# Patient Record
Sex: MALE | Race: WHITE | NOT HISPANIC OR LATINO | Employment: FULL TIME | URBAN - METROPOLITAN AREA
[De-identification: names, ages, dates, MRNs, and addresses within clinical notes are randomized per-mention and may not be internally consistent; named-entity substitution may affect disease eponyms.]

---

## 2017-09-18 ENCOUNTER — OFFICE VISIT (OUTPATIENT)
Dept: FAMILY MEDICINE | Facility: CLINIC | Age: 37
End: 2017-09-18
Payer: COMMERCIAL

## 2017-09-18 VITALS
WEIGHT: 220.4 LBS | BODY MASS INDEX: 31.55 KG/M2 | DIASTOLIC BLOOD PRESSURE: 74 MMHG | SYSTOLIC BLOOD PRESSURE: 110 MMHG | HEART RATE: 76 BPM | HEIGHT: 70 IN

## 2017-09-18 DIAGNOSIS — Z30.8 ENCOUNTER FOR OTHER CONTRACEPTIVE MANAGEMENT: Primary | ICD-10-CM

## 2017-09-18 PROCEDURE — 99213 OFFICE O/P EST LOW 20 MIN: CPT | Performed by: FAMILY MEDICINE

## 2017-09-18 RX ORDER — LORAZEPAM 1 MG/1
TABLET ORAL
Qty: 1 TABLET | Refills: 0 | Status: SHIPPED | OUTPATIENT
Start: 2017-09-18 | End: 2018-02-19

## 2017-09-18 NOTE — PROGRESS NOTES
SUBJECTIVE:  This gentleman is seen today for a vasectomy consult.  General vasectomy instruction sheet and post vas instruction sheet were made available to him and he had an opportunity to review these.  An opportunity for questions was given.     I explained the procedure in detail.  He realizes there is the risk of bleeding and infection, as well as postoperative discomfort for up to three months, development of sperm granulomas or epididymal cysts, and the possibility of failure to produce desired sterility.  He knows that he has to bring in a post vas sample after eight to twelve weeks and twelve to fifteen ejaculations, and that he needs a post vas semen analysis that shows no sperm before he can be considered sterile.  He knows that he will need to use another form of birth control until then.  He understands that he should be taking it easy over the several days afterwards with no heavy lifting, strenuous activity or sexual intercourse for one week after the procedure.    We will plan on doing this at his convenience with lorazepam 1 mg orally for sedation and local anesthetic.  He realizes that this is meant to be a permanent procedure.     History reviewed. No pertinent past medical history.      Past Surgical History:   Procedure Laterality Date     EXCISE GANGLION WRIST  3/9/2012    Procedure:EXCISE GANGLION WRIST; Excise Left Dorsal Ganglion with post interosseous neurectomy- choice Anesthesia; Surgeon:BEULAH GARCIA; Location:[place] OR     FRACTURE TX, WRIST RT/LT  2001    Fracture TX Wrist RT     wisdom teeth         OBJECTIVE:  General appearance: Healthy-appearing male  Genitalia: Not examined today as he had his young daughter in the office with him    ASSESSMENT:  Undesired fertility.     PLAN:  He will schedule for a vasectomy at his convenience.  General vasectomy information and post vas instruction sheet were sent home with him, and if he has questions in the meantime, he will call  me.

## 2017-09-18 NOTE — MR AVS SNAPSHOT
"              After Visit Summary   9/18/2017    Gabe Murillo    MRN: [id number]           Patient Information     Date Of Birth          1980        Visit Information        Provider Department      9/18/2017 3:40 PM SOTERO James MD [facility]        Today's Diagnoses     Encounter for other contraceptive management    -  1       Follow-ups after your visit        Who to contact     If you have questions or need follow up information about today's clinic visit or your schedule please contact [facility] directly at [phone].  Normal or non-critical lab and imaging results will be communicated to you by Sellobuyhart, letter or phone within 4 business days after the clinic has received the results. If you do not hear from us within 7 days, please contact the clinic through Athletic Standardt or phone. If you have a critical or abnormal lab result, we will notify you by phone as soon as possible.  Submit refill requests through Munchery or call your pharmacy and they will forward the refill request to us. Please allow 3 business days for your refill to be completed.          Additional Information About Your Visit        MyChart Information     Munchery gives you secure access to your electronic health record. If you see a primary care provider, you can also send messages to your care team and make appointments. If you have questions, please call your primary care clinic.  If you do not have a primary care provider, please call [phone] and they will assist you.        Care EveryWhere ID     This is your Care EveryWhere ID. This could be used by other organizations to access your [place] medical records  [id number]        Your Vitals Were     Pulse Height BMI (Body Mass Index)             76 5' 10\" (1.778 m) 31.62 kg/m2          Blood Pressure from Last 3 Encounters:   09/18/17 110/74   01/26/16 128/78   09/21/15 127/72    Weight from Last 3 Encounters:   09/18/17 220 lb 6.4 oz " (100 kg)   01/26/16 205 lb (93 kg)   09/21/15 191 lb (86.6 kg)              Today, you had the following     No orders found for display         Today's Medication Changes          These changes are accurate as of: 9/18/17  4:03 PM.  If you have any questions, ask your nurse or doctor.               Start taking these medicines.        Dose/Directions    LORazepam 1 MG tablet   Commonly known as:  ATIVAN   Used for:  Encounter for other contraceptive management   Started by:  SOTERO James MD        Take 60 minutes prior to appointment.  Do not operate a vehicle after taking this medication   Quantity:  1 tablet   Refills:  0            Where to get your medicines      Some of these will need a paper prescription and others can be bought over the counter.  Ask your nurse if you have questions.     Bring a paper prescription for each of these medications     LORazepam 1 MG tablet                Primary Care Provider Office Phone # Fax #    Taylor Damon [phone] [address] [facility] [address]        Equal Access to Services     [facility]: Hadii aad ku hadasho Soomaali, waaxda luqadaha, qaybta kaalmada adeegyada, waxay idiin hayaan juanito chan . So [facility] [phone].    ATENCIÓN: Si habla español, tiene a grimm disposición servicios gratuitos de asistencia lingüística. Llame al [phone].    We comply with applicable federal civil rights laws and [place] laws. We do not discriminate on the basis of race, color, national origin, age, disability sex, sexual orientation or gender identity.            Thank you!     Thank you for choosing [facility]  for your care. Our goal is always to provide you with excellent care. Hearing back from our patients is one way we can continue to improve our services. Please take a few minutes to complete the written survey that you may receive in the mail after your visit with us. Thank you!             Your Updated  Medication List - Protect others around you: Learn how to safely use, store and throw away your medicines at www.disposemymeds.org.          This list is accurate as of: 9/18/17  4:03 PM.  Always use your most recent med list.                   Brand Name Dispense Instructions for use Diagnosis    albuterol 108 (90 BASE) MCG/ACT Inhaler    PROAIR HFA/PROVENTIL HFA/VENTOLIN HFA    1 Inhaler    Inhale 2 puffs into the lungs every 6 hours as needed for shortness of breath / dyspnea or wheezing    Bronchospasm       citalopram 40 MG tablet    celeXA    90 tablet    1/2 tab QDAY x 7 days then 1 tab QDAY    Adjustment disorder with anxious mood       LORazepam 1 MG tablet    ATIVAN    1 tablet    Take 60 minutes prior to appointment.  Do not operate a vehicle after taking this medication    Encounter for other contraceptive management       MULTI-VITAMIN PO      Take  by mouth.

## 2017-09-18 NOTE — NURSING NOTE
"Chief Complaint   Patient presents with     Consult     vasectomy consult       Initial /74 (BP Location: Right arm, Patient Position: Chair, Cuff Size: Adult Large)  Pulse 76  Ht 5' 10\" (1.778 m)  Wt 220 lb 6.4 oz (100 kg)  BMI 31.62 kg/m2 Estimated body mass index is 31.62 kg/(m^2) as calculated from the following:    Height as of this encounter: 5' 10\" (1.778 m).    Weight as of this encounter: 220 lb 6.4 oz (100 kg).  Medication Reconciliation: complete     Rae Rowan, CMA      "

## 2017-09-28 ENCOUNTER — OFFICE VISIT (OUTPATIENT)
Dept: FAMILY MEDICINE | Facility: CLINIC | Age: 37
End: 2017-09-28
Payer: COMMERCIAL

## 2017-09-28 VITALS
DIASTOLIC BLOOD PRESSURE: 85 MMHG | WEIGHT: 208.2 LBS | BODY MASS INDEX: 29.81 KG/M2 | HEIGHT: 70 IN | HEART RATE: 78 BPM | SYSTOLIC BLOOD PRESSURE: 139 MMHG

## 2017-09-28 DIAGNOSIS — Z30.2 ENCOUNTER FOR STERILIZATION: Primary | ICD-10-CM

## 2017-09-28 PROCEDURE — 88302 TISSUE EXAM BY PATHOLOGIST: CPT | Performed by: FAMILY MEDICINE

## 2017-09-28 PROCEDURE — 55250 REMOVAL OF SPERM DUCT(S): CPT | Performed by: FAMILY MEDICINE

## 2017-09-28 NOTE — PATIENT INSTRUCTIONS
POSTOP VASECTOMY INSTRUCTIONS    There is usually very little discomfort or risk associated with the vasectomy, but you can expect some soreness after the operation.  The less work you do over the 36 hours after the procedure, the less chance that you will have more than mild to moderate discomfort.  it is best to lie quietly for the entire day after the surgery.    There are potential risks to this procedure, as with any procedure, but again, these are very rare.  You may get some bleeding under the skin with black and blue marks occurring.  Using ice bags to the scrotum once you go home greatly decreases the chance of developing this bruising.  Even so, this is usually minimal if it does occur.  It is possible to get an infection from the surgery and it is important to keep the area clean and dry for one week after the procedure allowing yourself only to shower and not bathe.  If you notice that you are getting a lot of swelling or bleeding or soreness, it is important that you talk to your physician or the physician on call immediately.    It is important that you avoid any athletic exertion, hard labor, long car rides and sexual activity for at least a few days afterward.    You may take Tylenol, Advil or similar analgesic for pain.  If you have skin sutures, these should dissolve and fall out spontaneously in 2-3 weeks.  During that time, you may have a little bit of black and blue discoloration around the incision and some local swelling.    FINALLY, it is VERY IMPORTANT that you furnish 2 semen specimens for microscopic examination - the first at 8-12 weeks, and the second at 12 months after the surgery.  The following instructions would apply to collection of the specimen:    Collect your semen in the morning directly into the container supplied by us.  Bring it into the office within 3-4 hours after ejaculation.    The sperm sample must be kept warm.    Sperm can be collected by either interrupting  "intercourse or by masturbation.    You may \"milk\" the specimen into the container from a condom which does not contain a spermicide.    Continue to use precautions against pregnancy until you have been informed that the semen exam failed to  show any sperm.  "

## 2017-09-28 NOTE — MR AVS SNAPSHOT
After Visit Summary   9/28/2017    Gabe Murillo    MRN: [id number]           Patient Information     Date Of Birth          1980        Visit Information        Provider Department      9/28/2017 3:40 PM Post, SOTERO Taylor MD [facility]        Today's Diagnoses     Encounter for sterilization    -  1      Care Instructions      POSTOP VASECTOMY INSTRUCTIONS    There is usually very little discomfort or risk associated with the vasectomy, but you can expect some soreness after the operation.  The less work you do over the 36 hours after the procedure, the less chance that you will have more than mild to moderate discomfort.  it is best to lie quietly for the entire day after the surgery.    There are potential risks to this procedure, as with any procedure, but again, these are very rare.  You may get some bleeding under the skin with black and blue marks occurring.  Using ice bags to the scrotum once you go home greatly decreases the chance of developing this bruising.  Even so, this is usually minimal if it does occur.  It is possible to get an infection from the surgery and it is important to keep the area clean and dry for one week after the procedure allowing yourself only to shower and not bathe.  If you notice that you are getting a lot of swelling or bleeding or soreness, it is important that you talk to your physician or the physician on call immediately.    It is important that you avoid any athletic exertion, hard labor, long car rides and sexual activity for at least a few days afterward.    You may take Tylenol, Advil or similar analgesic for pain.  If you have skin sutures, these should dissolve and fall out spontaneously in 2-3 weeks.  During that time, you may have a little bit of black and blue discoloration around the incision and some local swelling.    FINALLY, it is VERY IMPORTANT that you furnish 2 semen specimens for microscopic examination - the first at 8-12  "weeks, and the second at 12 months after the surgery.  The following instructions would apply to collection of the specimen:    Collect your semen in the morning directly into the container supplied by us.  Bring it into the office within 3-4 hours after ejaculation.    The sperm sample must be kept warm.    Sperm can be collected by either interrupting intercourse or by masturbation.    You may \"milk\" the specimen into the container from a condom which does not contain a spermicide.    Continue to use precautions against pregnancy until you have been informed that the semen exam failed to  show any sperm.          Follow-ups after your visit        Future tests that were ordered for you today     Open Future Orders        Priority Expected Expires Ordered    Semen Post Vasectomy Qual ([place], [place], [place]) Routine  9/28/2018 9/28/2017            Who to contact     If you have questions or need follow up information about today's clinic visit or your schedule please contact [facility] directly at [phone].  Normal or non-critical lab and imaging results will be communicated to you by CM Sistemihart, letter or phone within 4 business days after the clinic has received the results. If you do not hear from us within 7 days, please contact the clinic through Upkeep Charliet or phone. If you have a critical or abnormal lab result, we will notify you by phone as soon as possible.  Submit refill requests through makemoji or call your pharmacy and they will forward the refill request to us. Please allow 3 business days for your refill to be completed.          Additional Information About Your Visit        [facility] Information     makemoji gives you secure access to your electronic health record. If you see a primary care provider, you can also send messages to your care team and make appointments. If you have questions, please call your primary care clinic.  If you do not have a primary care provider, please call [phone] " "and they will assist you.        Care EveryWhere ID     This is your Care EveryWhere ID. This could be used by other organizations to access your [place] medical records  [id number]        Your Vitals Were     Pulse Height BMI (Body Mass Index)             78 5' 10\" (1.778 m) 29.87 kg/m2          Blood Pressure from Last 3 Encounters:   09/28/17 139/85   09/18/17 110/74   01/26/16 128/78    Weight from Last 3 Encounters:   09/28/17 208 lb 3.2 oz (94.4 kg)   09/18/17 220 lb 6.4 oz (100 kg)   01/26/16 205 lb (93 kg)              We Performed the Following     REMOVAL OF SPERM DUCT(S) [07319]     SURGICAL PATHOLOGY EXAM        Primary Care Provider Office Phone # Fax #    Taylor Damon [phone] [address]  [facility] [address]        Equal Access to Services     MIREILLE GRIMM : Hadii aad ku hadasho Soomaali, waaxda luqadaha, qaybta kaalmada adeegyada, waxay lorrainein haypamn juanito lee lamillan . So [facility] [phone].    ATENCIÓN: Si habla español, tiene a grimm disposición servicios gratuitos de asistencia lingüística. Llame al [phone].    We comply with applicable federal civil rights laws and [place] laws. We do not discriminate on the basis of race, color, national origin, age, disability sex, sexual orientation or gender identity.            Thank you!     Thank you for choosing [facility]  for your care. Our goal is always to provide you with excellent care. Hearing back from our patients is one way we can continue to improve our services. Please take a few minutes to complete the written survey that you may receive in the mail after your visit with us. Thank you!             Your Updated Medication List - Protect others around you: Learn how to safely use, store and throw away your medicines at www.disposemymeds.org.          This list is accurate as of: 9/28/17  4:55 PM.  Always use your most recent med list.                   Brand Name Dispense " Instructions for use Diagnosis    albuterol 108 (90 BASE) MCG/ACT Inhaler    PROAIR HFA/PROVENTIL HFA/VENTOLIN HFA    1 Inhaler    Inhale 2 puffs into the lungs every 6 hours as needed for shortness of breath / dyspnea or wheezing    Bronchospasm       citalopram 40 MG tablet    celeXA    90 tablet    1/2 tab QDAY x 7 days then 1 tab QDAY    Adjustment disorder with anxious mood       LORazepam 1 MG tablet    ATIVAN    1 tablet    Take 60 minutes prior to appointment.  Do not operate a vehicle after taking this medication    Encounter for other contraceptive management       MULTI-VITAMIN PO      Take  by mouth.

## 2017-09-28 NOTE — PROGRESS NOTES
Gabe Murillo is a 37 year old male here for vasectomy.     He has been in previously for vasectomy consult in which we discussed the no-scalpel procedure, including risks and benefits, and other options of birth control.  All questions have been answered and the consent form is signed.         Procedure:  He was placed in the supine position on the procedure table.  He was prepped and draped in the usual sterile fashion.  The vas was identified and brought up to the surface.  The skin overlying the vas was anesthetized with lidocaine and further lidocaine injected into the vas sheath.  The vas clamp was used to grasp the vas and the dissecting instrument was used to puncture the skin and dissect out the vas free of tissue. A segment of the vas was removed, both ends were burned and the proximal  and distal end tied with 4-0 silk.  The procedure was repeated for the other vas.  There were no complications.  He tolerated the procedure well.             A:  Vasectomy         P:  He was given post-vasectomy instructions.  He should apply ice   and wear support for the next 2-3 days.  He should  abstain from sexual intercourse and any heavy lifting for the next week.  Call or return to clinic for any  problems.  The excised specimens were sent for pathology.  He may use Ibuprofen 800 mg po TID prn for  pain.  He is not  considered sterile until follow up vas specimens are free of sperm.  justino Murillo is a 37 year old male here for vasectomy.     He has been in previously for vasectomy consult in which we discussed the no-scalpel procedure, including risks and benefits, and other options of birth control.  All questions have been answered and the consent form is signed.         Procedure:  He was placed in the supine position on the procedure table.  He was prepped and draped in the usual sterile fashion.  The vas was identified and brought up to the surface.  The skin overlying the vas was anesthetized with lidocaine  and further lidocaine injected into the vas sheath.  The vas clamp was used to grasp the vas and the dissecting instrument was used to puncture the skin and dissect out the vas free of tissue. A segment of the vas was removed, both ends were burned and the proximal  and distal end tied with 4-0 silk.  The procedure was repeated for the other vas.  There were no complications.  He tolerated the procedure well.             A:  Vasectomy         P:  He was given post-vasectomy instructions.  He should apply ice   and wear support for the next 2-3 days.  He should  abstain from sexual intercourse and any heavy lifting for the next week.  Call or return to clinic for any  problems.  The excised specimens were sent for pathology.  He may use Ibuprofen 800 mg po TID prn for  pain.  He is not  considered sterile until follow up vas specimens are free of sperm.

## 2017-10-02 LAB — COPATH REPORT: NORMAL

## 2017-12-18 ENCOUNTER — OFFICE VISIT (OUTPATIENT)
Dept: FAMILY MEDICINE | Facility: CLINIC | Age: 37
End: 2017-12-18
Payer: COMMERCIAL

## 2017-12-18 VITALS
OXYGEN SATURATION: 95 % | HEIGHT: 70 IN | DIASTOLIC BLOOD PRESSURE: 74 MMHG | WEIGHT: 215.9 LBS | TEMPERATURE: 97.8 F | BODY MASS INDEX: 30.91 KG/M2 | SYSTOLIC BLOOD PRESSURE: 128 MMHG | HEART RATE: 70 BPM

## 2017-12-18 DIAGNOSIS — J20.9 ACUTE BRONCHITIS, UNSPECIFIED ORGANISM: Primary | ICD-10-CM

## 2017-12-18 PROCEDURE — 99213 OFFICE O/P EST LOW 20 MIN: CPT | Performed by: FAMILY MEDICINE

## 2017-12-18 RX ORDER — DOXYCYCLINE 100 MG/1
100 CAPSULE ORAL 2 TIMES DAILY
Qty: 20 CAPSULE | Refills: 0 | Status: SHIPPED | OUTPATIENT
Start: 2017-12-18 | End: 2018-02-19

## 2017-12-18 NOTE — NURSING NOTE
"Chief Complaint   Patient presents with     URI       Initial /74 (BP Location: Right arm, Patient Position: Sitting, Cuff Size: Adult Large)  Pulse 70  Temp 97.8  F (36.6  C) (Tympanic)  Ht 5' 10\" (1.778 m)  Wt 215 lb 14.4 oz (97.9 kg)  SpO2 95%  BMI 30.98 kg/m2 Estimated body mass index is 30.98 kg/(m^2) as calculated from the following:    Height as of this encounter: 5' 10\" (1.778 m).    Weight as of this encounter: 215 lb 14.4 oz (97.9 kg).  Medication Reconciliation: complete   Haley Ferrer CMA  "

## 2017-12-18 NOTE — MR AVS SNAPSHOT
"              After Visit Summary   12/18/2017    Gabe Murillo    MRN: [id number]           Patient Information     Date Of Birth          1980        Visit Information        Provider Department      12/18/2017 9:40 AM Haile Will MD [facility]        Today's Diagnoses     Acute bronchitis, unspecified organism    -  1       Follow-ups after your visit        Who to contact     Normal or non-critical lab and imaging results will be communicated to you by demandmarthart, letter or phone within 4 business days after the clinic has received the results. If you do not hear from us within 7 days, please contact the clinic through demandmarthart or phone. If you have a critical or abnormal lab result, we will notify you by phone as soon as possible.  Submit refill requests through Glokalise or call your pharmacy and they will forward the refill request to us. Please allow 3 business days for your refill to be completed.          If you need to speak with a  for additional information , please call: [phone]             Additional Information About Your Visit        Glokalise Information     Glokalise gives you secure access to your electronic health record. If you see a primary care provider, you can also send messages to your care team and make appointments. If you have questions, please call your primary care clinic.  If you do not have a primary care provider, please call [phone] and they will assist you.        Care EveryWhere ID     This is your Care EveryWhere ID. This could be used by other organizations to access your [place] medical records  [id number]        Your Vitals Were     Pulse Temperature Height Pulse Oximetry BMI (Body Mass Index)       70 97.8  F (36.6  C) (Tympanic) 5' 10\" (1.778 m) 95% 30.98 kg/m2        Blood Pressure from Last 3 Encounters:   12/18/17 128/74   09/28/17 139/85   09/18/17 110/74    Weight from Last 3 Encounters:   12/18/17 215 lb 14.4 oz (97.9 kg) "   09/28/17 208 lb 3.2 oz (94.4 kg)   09/18/17 220 lb 6.4 oz (100 kg)              Today, you had the following     No orders found for display         Today's Medication Changes          These changes are accurate as of: 12/18/17  1:18 PM.  If you have any questions, ask your nurse or doctor.               Start taking these medicines.        Dose/Directions    doxycycline 100 MG capsule   Commonly known as:  VIBRAMYCIN   Used for:  Acute bronchitis, unspecified organism   Started by:  Haile Will MD        Dose:  100 mg   Take 1 capsule (100 mg) by mouth 2 times daily   Quantity:  20 capsule   Refills:  0            Where to get your medicines      These medications were sent to [facility] [place], [place] - [address]  [address] [facility] [address]     Phone:  [phone]     doxycycline 100 MG capsule                Primary Care Provider Office Phone # Fax #    Taylor Damon [phone] [address] [facility] [address]        Equal Access to Services     [facility]: Hadii aad ku hadasho Soomaali, waaxda luqadaha, qaybta kaalmada adeegyada, waxay idiin hayaan juanito khlester chan . So [facility] [phone].    ATENCIÓN: Si habla español, tiene a grimm disposición servicios gratuitos de asistencia lingüística. Llame al [phone].    We comply with applicable federal civil rights laws and [place] laws. We do not discriminate on the basis of race, color, national origin, age, disability, sex, sexual orientation, or gender identity.            Thank you!     Thank you for choosing [facility]  for your care. Our goal is always to provide you with excellent care. Hearing back from our patients is one way we can continue to improve our services. Please take a few minutes to complete the written survey that you may receive in the mail after your visit with us. Thank you!             Your Updated Medication List - Protect others around you:  Learn how to safely use, store and throw away your medicines at www.disposemymeds.org.          This list is accurate as of: 12/18/17  1:18 PM.  Always use your most recent med list.                   Brand Name Dispense Instructions for use Diagnosis    albuterol 108 (90 BASE) MCG/ACT Inhaler    PROAIR HFA/PROVENTIL HFA/VENTOLIN HFA    1 Inhaler    Inhale 2 puffs into the lungs every 6 hours as needed for shortness of breath / dyspnea or wheezing    Bronchospasm       citalopram 40 MG tablet    celeXA    90 tablet    1/2 tab QDAY x 7 days then 1 tab QDAY    Adjustment disorder with anxious mood       doxycycline 100 MG capsule    VIBRAMYCIN    20 capsule    Take 1 capsule (100 mg) by mouth 2 times daily    Acute bronchitis, unspecified organism       LORazepam 1 MG tablet    ATIVAN    1 tablet    Take 60 minutes prior to appointment.  Do not operate a vehicle after taking this medication    Encounter for other contraceptive management       MULTI-VITAMIN PO      Take  by mouth.

## 2017-12-18 NOTE — PROGRESS NOTES
SUBJECTIVE:                                                    Gabe Murillo is a 37 year old male who presents to clinic today for the following health issues:    ENT Symptoms             Symptoms: cc Present Absent Comment   Fever/Chills   x    Fatigue  x     Muscle Aches   x    Eye Irritation  x  Very wattery   Sneezing  x     Nasal David/Drg  x     Sinus Pressure/Pain   x    Loss of smell   x    Dental pain   x    Sore Throat  x  He believes that is from coughing so much.   Swollen Glands   x    Ear Pain/Fullness   x    Cough x x     Wheeze  x     Chest Pain   x    Shortness of breath  x     Rash   x    Other   x      Symptom duration:  a week   Symptom severity:  moderate   Treatments tried:  dayquil and mucinex   Contacts:  not that he is aware of.            Problem list and histories reviewed & adjusted, as indicated.  Additional history:     Patient Active Problem List   Diagnosis     Genital warts     CARDIOVASCULAR SCREENING; LDL GOAL LESS THAN 130     Anxiety     Past Surgical History:   Procedure Laterality Date     EXCISE GANGLION WRIST  3/9/2012    Procedure:EXCISE GANGLION WRIST; Excise Left Dorsal Ganglion with post interosseous neurectomy- choice Anesthesia; Surgeon:BEULAH GARCIA; Location:[place] [facility]     FRACTURE TX, WRIST RT/LT  2001    Fracture TX Wrist RT     VASECTOMY Bilateral 09/28/2017    No scalpel technique     wisdom teeth         Social History   Substance Use Topics     Smoking status: Never Smoker     Smokeless tobacco: Never Used     Alcohol use Yes     Family History   Problem Relation Age of Onset     Anxiety Disorder Mother      Anxiety Disorder Father      Arthritis Maternal Grandmother      Dementia Maternal Grandmother              ROS:  Constitutional, HEENT, cardiovascular, pulmonary, gi and gu systems are negative, except as otherwise noted.      OBJECTIVE:                                                    /74 (BP Location: Right arm, Patient Position: Sitting, Cuff  "Size: Adult Large)  Pulse 70  Temp 97.8  F (36.6  C) (Tympanic)  Ht 5' 10\" (1.778 m)  Wt 215 lb 14.4 oz (97.9 kg)  SpO2 95%  BMI 30.98 kg/m2 Body mass index is 30.98 kg/(m^2).   GENERAL: healthy, alert, well nourished, well hydrated, no distress  HENT: ear canals- normal; TMs- normal; Nose- normal; Mouth- no ulcers, no lesions  NECK: no tenderness, no adenopathy, no asymmetry, no masses, no stiffness; thyroid- normal to palpation  RESP: lungs clear to auscultation - no rales, no rhonchi, no wheezes  CV: regular rates and rhythm, normal S1 S2, no S3 or S4 and no murmur, no click or rub -  ABDOMEN: soft, no tenderness, no  hepatosplenomegaly, no masses, normal bowel sounds       ASSESSMENT/PLAN:                                                      (J20.9) Acute bronchitis, unspecified organism  (primary encounter diagnosis)  Plan: doxycycline (VIBRAMYCIN) 100 MG capsule     reports that he has never smoked. He has never used smokeless tobacco.        Weight management plan: Discussed healthy diet and exercise guidelines and patient will follow up in 6 months in clinic to re-evaluate.    [facility]  "

## 2017-12-21 ENCOUNTER — TELEPHONE (OUTPATIENT)
Dept: FAMILY MEDICINE | Facility: CLINIC | Age: 37
End: 2017-12-21

## 2017-12-21 DIAGNOSIS — Z30.2 ENCOUNTER FOR STERILIZATION: ICD-10-CM

## 2017-12-21 LAB — SEMEN ANALYSIS P VAS PNL: NORMAL

## 2017-12-21 PROCEDURE — 89321 SEMEN ANAL SPERM DETECTION: CPT | Performed by: FAMILY MEDICINE

## 2017-12-21 NOTE — TELEPHONE ENCOUNTER
Patient is calling wanting his lab results and I gave them to him. Understood.  Christina Hensley  Clinic Station  Flex

## 2017-12-21 NOTE — PROGRESS NOTES
Gabe,  The semen check showed no sperm.  You may discontinue the current method of birth control.  Have a [facility] :)      Brandon James MD

## 2018-02-19 ENCOUNTER — OFFICE VISIT (OUTPATIENT)
Dept: FAMILY MEDICINE | Facility: CLINIC | Age: 38
End: 2018-02-19
Payer: COMMERCIAL

## 2018-02-19 ENCOUNTER — TELEPHONE (OUTPATIENT)
Dept: FAMILY MEDICINE | Facility: CLINIC | Age: 38
End: 2018-02-19

## 2018-02-19 VITALS
WEIGHT: 215 LBS | TEMPERATURE: 98.4 F | HEIGHT: 70 IN | BODY MASS INDEX: 30.78 KG/M2 | HEART RATE: 89 BPM | DIASTOLIC BLOOD PRESSURE: 76 MMHG | RESPIRATION RATE: 18 BRPM | SYSTOLIC BLOOD PRESSURE: 132 MMHG

## 2018-02-19 DIAGNOSIS — J02.0 STREP THROAT: Primary | ICD-10-CM

## 2018-02-19 DIAGNOSIS — J10.1 INFLUENZA A: ICD-10-CM

## 2018-02-19 LAB
DEPRECATED S PYO AG THROAT QL EIA: ABNORMAL
SPECIMEN SOURCE: ABNORMAL

## 2018-02-19 PROCEDURE — 99214 OFFICE O/P EST MOD 30 MIN: CPT | Performed by: FAMILY MEDICINE

## 2018-02-19 PROCEDURE — 87880 STREP A ASSAY W/OPTIC: CPT | Performed by: FAMILY MEDICINE

## 2018-02-19 RX ORDER — OSELTAMIVIR PHOSPHATE 75 MG/1
75 CAPSULE ORAL 2 TIMES DAILY
Qty: 10 CAPSULE | Refills: 0 | Status: SHIPPED | OUTPATIENT
Start: 2018-02-19 | End: 2018-08-06

## 2018-02-19 RX ORDER — CEPHALEXIN 500 MG/1
500 CAPSULE ORAL 2 TIMES DAILY
Qty: 20 CAPSULE | Refills: 0 | Status: SHIPPED | OUTPATIENT
Start: 2018-02-19 | End: 2018-08-06

## 2018-02-19 ASSESSMENT — PAIN SCALES - GENERAL: PAINLEVEL: NO PAIN (0)

## 2018-02-19 NOTE — PATIENT INSTRUCTIONS
Thank you for choosing [facility].  You may be receiving a survey in the mail from [facility] regarding your visit today.  Please take a few minutes to complete and return the survey to let us know how we are doing.      Our Clinic hours are:  Mondays    7:20 am - 7 pm  Tues -  Fri  7:20 am - 5 pm    Clinic Phone: [phone]    The clinic lab opens at 7:30 am Mon - Fri and appointments are required.    [facility]. [phone]  Monday-Thursday 8 am - 7pm  Tues/Wed/Fri 8 am - 5:30 pm

## 2018-02-19 NOTE — PROGRESS NOTES
"  SUBJECTIVE:   Gabe Murillo is a 38 year old male who presents to clinic today for the following health issues:      Acute Illness   Acute illness concerns: fever, body aches  Onset: last night    Fever: YES-     Chills/Sweats: YES    Headache (location?): YES    Sinus Pressure:no    Conjunctivitis:  YES: both    Ear Pain: YES: both    Rhinorrhea: no    Congestion: no    Sore Throat: YES     Cough: no    Wheeze: no     Decreased Appetite: YES    Nausea: no     Vomiting: no     Diarrhea:  YES    Dysuria/Freq.: no     Fatigue/Achiness: YES    Sick/Strep Exposure: YES- family  (daughter had influenza and wife has strep)     Therapies Tried and outcome: motrin          Problem list and histories reviewed & adjusted, as indicated.  Additional history: He states he has never had strep before        Reviewed and updated as needed this visit by clinical staff       Reviewed and updated as needed this visit by Provider               ROS:  Constitutional, HEENT, cardiovascular, pulmonary, gi and gu systems are negative, except as otherwise noted.        OBJECTIVE:                                                    /76  Pulse 89  Temp 98.4  F (36.9  C) (Tympanic)  Resp 18  Ht 5' 10\" (1.778 m)  Wt 215 lb (97.5 kg)  BMI 30.85 kg/m2    GENERAL: healthy, alert and no distress  EYES: Eyes grossly normal to inspection, extraocular movements - intact, and PERRL  HENT: ear canals and TM's normal, tonsillar hypertrophy and tonsillar erythema  NECK: no tenderness, no adenopathy, no asymmetry, no masses, no stiffness; thyroid- normal to palpation  RESP: lungs clear to auscultation - no rales, no rhonchi, no wheezes  CV: regular rates and rhythm, normal S1 S2, no S3 or S4 and no murmur, no click or rub -  MS: extremities- no gross deformities noted, no edema    Diagnostic test results:  Strep screen - Positive     ASSESSMENT/PLAN:                                                    ASSESSMENT:  1. Strep throat    2. Influenza " A        PLAN:  We will hold off on starting the Tamiflu.  Will take the Keflex for the strep and if he feels much better in 12-24 hours will skip the Tamiflu.  If he still has achiness and fever would then start the Tamiflu right away so that it could be effective      Orders Placed This Encounter     oseltamivir (TAMIFLU) 75 MG capsule     cephALEXin (KEFLEX) 500 MG capsule       Patient Instructions         Thank you for choosing [facility].  You may be receiving a survey in the mail from [facility] regarding your visit today.  Please take a few minutes to complete and return the survey to let us know how we are doing.      Our Clinic hours are:  Mondays    7:20 am - 7 pm  Tues -  Fri  7:20 am - 5 pm    Clinic Phone: [phone]    The clinic lab opens at 7:30 am Mon - Fri and appointments are required.    [facility]  Ph. [phone]  Monday-Thursday 8 am - 7pm  Tues/Wed/Fri 8 am - 5:30 pm             SOTERO James  [facility]

## 2018-02-19 NOTE — TELEPHONE ENCOUNTER
Wife called and would like patient to be on tamiflu.  Patient will call back to discuss symptoms.    Aurora ASTORGA RN

## 2018-02-19 NOTE — MR AVS SNAPSHOT
After Visit Summary   2/19/2018    Gabe Murillo    MRN: [id number]           Patient Information     Date Of Birth          1980        Visit Information        Provider Department      2/19/2018 5:40 PM Jacob, SOTERO aTylor MD [facility]        Today's Diagnoses     Influenza A    -  1    Throat pain        Strep throat          Care Instructions          Thank you for choosing [facility].  You may be receiving a survey in the mail from [facility] regarding your visit today.  Please take a few minutes to complete and return the survey to let us know how we are doing.      Our Clinic hours are:  Mondays    7:20 am - 7 pm  Tues -  Fri  7:20 am - 5 pm    Clinic Phone: [phone]    The clinic lab opens at 7:30 am Mon - Fri and appointments are required.    [facility]  Ph. [phone]  Monday-Thursday 8 am - 7pm  Tues/Wed/Fri 8 am - 5:30 pm                 Follow-ups after your visit        Who to contact     If you have questions or need follow up information about today's clinic visit or your schedule please contact [facility] directly at [phone].  Normal or non-critical lab and imaging results will be communicated to you by MyChart, letter or phone within 4 business days after the clinic has received the results. If you do not hear from us within 7 days, please contact the clinic through MediaXstreamhart or phone. If you have a critical or abnormal lab result, we will notify you by phone as soon as possible.  Submit refill requests through ipsy or call your pharmacy and they will forward the refill request to us. Please allow 3 business days for your refill to be completed.          Additional Information About Your Visit        MyChart Information     ipsy gives you secure access to your electronic health record. If you see a primary care provider, you can also send messages to your care team and make appointments. If you have  "questions, please call your primary care clinic.  If you do not have a primary care provider, please call [phone] and they will assist you.        Care EveryWhere ID     This is your Care EveryWhere ID. This could be used by other organizations to access your [place] medical records  [id number]        Your Vitals Were     Pulse Temperature Respirations Height BMI (Body Mass Index)       89 98.4  F (36.9  C) (Tympanic) 18 5' 10\" (1.778 m) 30.85 kg/m2        Blood Pressure from Last 3 Encounters:   02/19/18 132/76   12/18/17 128/74   09/28/17 139/85    Weight from Last 3 Encounters:   02/19/18 215 lb (97.5 kg)   12/18/17 215 lb 14.4 oz (97.9 kg)   09/28/17 208 lb 3.2 oz (94.4 kg)              We Performed the Following     Strep, Rapid Screen          Today's Medication Changes          These changes are accurate as of 2/19/18  5:56 PM.  If you have any questions, ask your nurse or doctor.               Start taking these medicines.        Dose/Directions    cephALEXin 500 MG capsule   Commonly known as:  KEFLEX   Used for:  Strep throat   Started by:  SOTERO James MD        Dose:  500 mg   Take 1 capsule (500 mg) by mouth 2 times daily   Quantity:  20 capsule   Refills:  0       oseltamivir 75 MG capsule   Commonly known as:  TAMIFLU   Used for:  Influenza A   Started by:  SOTERO James MD        Dose:  75 mg   Take 1 capsule (75 mg) by mouth 2 times daily   Quantity:  10 capsule   Refills:  0            Where to get your medicines      These medications were sent to [facility] [place], [place] - [address]  [address] [facility] [address]     Phone:  [phone]     cephALEXin 500 MG capsule    oseltamivir 75 MG capsule                Primary Care Provider Office Phone # Fax #    Taylor Damon [phone] [address] [facility] [address]        Equal Access to Services     MIREILLE GRIMM AH: Hadii alisson Pappas, " oleksandr mendez, aida kasavannah jernigan, garrison lorrainein hayaan bettybirgit cainlester laModestoaacasie ah. So [facility] [phone].    ATENCIÓN: Si emilyla sasha, tiene a grimm disposición servicios gratuitos de asistencia lingüística. Claireame al [phone].    We comply with applicable federal civil rights laws and [place] laws. We do not discriminate on the basis of race, color, national origin, age, disability, sex, sexual orientation, or gender identity.            Thank you!     Thank you for choosing [facility]  for your care. Our goal is always to provide you with excellent care. Hearing back from our patients is one way we can continue to improve our services. Please take a few minutes to complete the written survey that you may receive in the mail after your visit with us. Thank you!             Your Updated Medication List - Protect others around you: Learn how to safely use, store and throw away your medicines at www.disposemymeds.org.          This list is accurate as of 2/19/18  5:56 PM.  Always use your most recent med list.                   Brand Name Dispense Instructions for use Diagnosis    cephALEXin 500 MG capsule    KEFLEX    20 capsule    Take 1 capsule (500 mg) by mouth 2 times daily    Strep throat       oseltamivir 75 MG capsule    TAMIFLU    10 capsule    Take 1 capsule (75 mg) by mouth 2 times daily    Influenza A

## 2018-08-06 ENCOUNTER — HOSPITAL ENCOUNTER (EMERGENCY)
Facility: CLINIC | Age: 38
Discharge: HOME | End: 2018-08-06
Attending: PHYSICIAN ASSISTANT | Admitting: PHYSICIAN ASSISTANT
Payer: COMMERCIAL

## 2018-08-06 VITALS
TEMPERATURE: 98.2 F | DIASTOLIC BLOOD PRESSURE: 89 MMHG | SYSTOLIC BLOOD PRESSURE: 152 MMHG | OXYGEN SATURATION: 98 % | BODY MASS INDEX: 30.8 KG/M2 | RESPIRATION RATE: 18 BRPM | HEIGHT: 71 IN | WEIGHT: 220 LBS

## 2018-08-06 DIAGNOSIS — B02.9 HERPES ZOSTER WITHOUT COMPLICATION: ICD-10-CM

## 2018-08-06 DIAGNOSIS — M79.10 MYALGIA: ICD-10-CM

## 2018-08-06 PROCEDURE — 99213 OFFICE O/P EST LOW 20 MIN: CPT | Mod: Z6 | Performed by: PHYSICIAN ASSISTANT

## 2018-08-06 PROCEDURE — G0463 HOSPITAL OUTPT CLINIC VISIT: HCPCS | Performed by: PHYSICIAN ASSISTANT

## 2018-08-06 RX ORDER — CYCLOBENZAPRINE HCL 10 MG
10 TABLET ORAL 2 TIMES DAILY PRN
Qty: 15 TABLET | Refills: 0 | Status: SHIPPED | OUTPATIENT
Start: 2018-08-06 | End: 2020-01-07

## 2018-08-06 RX ORDER — ACYCLOVIR 800 MG/1
800 TABLET ORAL
Qty: 35 TABLET | Refills: 0 | Status: SHIPPED | OUTPATIENT
Start: 2018-08-06 | End: 2020-01-07

## 2018-08-06 NOTE — ED AVS SNAPSHOT
[facility]    [address] [facility] [address]    Phone:  [phone]    Fax:  [phone]                                       Gabe Murillo   MRN: [id number]    Department:  [facility]   Date of Visit:  8/6/2018           After Visit Summary Signature Page     I have received my discharge instructions, and my questions have been answered. I have discussed any challenges I see with this plan with the nurse or doctor.    ..........................................................................................................................................  Patient/Patient Representative Signature      ..........................................................................................................................................  Patient Representative Print Name and Relationship to Patient    ..................................................               ................................................  Date                                            Time    ..........................................................................................................................................  Reviewed by Signature/Title    ...................................................              ..............................................  Date                                                            Time

## 2018-08-06 NOTE — DISCHARGE INSTRUCTIONS
Patient informed of viral etiology and treatment discussed in office.   Patient contagious until all vesicles scabbed over. Patient to avoid pregnant women, the immunocompromised, the very young and elderly.     Use medication as directed; caution with flagyl can cause sedation.     Follow up with PCP in 1-2 weeks if rash resolves and neck pain persists.    Go to Emergency Room if sx worsen or change, numbness in arms, neck stiffness, worst headache, visual changes, confusion, arm weakness occurs  May use acetaminophen, ibuprofen as needed for pain.     Patient voiced understanding of instructions given.

## 2018-08-06 NOTE — ED PROVIDER NOTES
History     Chief Complaint   Patient presents with     Rash     neck upper back/ possible shingles     HPI  Gabe Murillo is a 38 year old male who presents to the urgent care for right sided rash to the anterior and posterior shoulder/neck and starting to move down the right arm.  Patient states rash started 3 days ago.  Patient has been having some neck and muscle pain for the past 5-7 days with some tingling sensation or pain/burning sensation on the right shoulder and neck.  Patient states he has been seeing a chiropractor for this and had 2-3 adjustments over the past week with the last adjustment being 4 days ago and including an ultrasonic wave treatment. Patient not sure if this caused the rash or not. Patient states he has been putting topical calamine lotion on area with no relief. Patient states the muscles in his neck and shoulder and causing him to have a hard time sleeping at night and if he pushes on the tight muscle in the neck it relieves the pain. Patient denies numbness/weakness in the lower extremities. Patient states positive muscle tightness, painful/burning rash, and occasional slight headache.       Problem List:    Patient Active Problem List    Diagnosis Date Noted     Anxiety 09/29/2015     Priority: Medium     CARDIOVASCULAR SCREENING; LDL GOAL LESS THAN 130 08/05/2014     Priority: Medium     Genital warts 01/26/2011     Priority: Medium        Past Medical History:    History reviewed. No pertinent past medical history.    Past Surgical History:    Past Surgical History:   Procedure Laterality Date     EXCISE GANGLION WRIST  3/9/2012    Procedure:EXCISE GANGLION WRIST; Excise Left Dorsal Ganglion with post interosseous neurectomy- choice Anesthesia; Surgeon:BEULAH GARCIA; Location:[facility] [facility]     FRACTURE TX, WRIST RT/LT  2001    Fracture TX Wrist RT     VASECTOMY Bilateral 09/28/2017    No scalpel technique     wisdom teeth         Family History:    Family History   Problem Relation  "Age of Onset     Anxiety Disorder Mother      Anxiety Disorder Father      Arthritis Maternal Grandmother      Dementia Maternal Grandmother        Social History:  Marital Status:   [2]  Social History   Substance Use Topics     Smoking status: Never Smoker     Smokeless tobacco: Never Used     Alcohol use Yes        Medications:      acyclovir (ZOVIRAX) 800 MG tablet   cyclobenzaprine (FLEXERIL) 10 MG tablet   escitalopram (LEXAPRO) 10 MG tablet   HYDROcodone-acetaminophen (NORCO) 5-325 MG per tablet         Review of Systems   Constitutional: Negative for fatigue and fever.   Eyes: Negative for visual disturbance.   Musculoskeletal: Negative for neck stiffness.        Right sided neck pain and muscle tightness.    Skin: Rash: right shoulder, back/chest, and down right arm.    Neurological: Positive for headaches (occasionally; nothing currently. ). Negative for weakness, light-headedness and numbness.   All other systems reviewed and are negative.      Physical Exam   BP: 152/89  Heart Rate: 73  Temp: 98.2  F (36.8  C)  Resp: 18  Height: 180.3 cm (5' 11\")  Weight: 99.8 kg (220 lb)  SpO2: 98 %      Physical Exam   Constitutional: He is oriented to person, place, and time. He appears well-developed and well-nourished. No distress.   HENT:   Right Ear: External ear normal.   Left Ear: External ear normal.   Nose: Nose normal.   Mouth/Throat: Oropharynx is clear and moist. No oropharyngeal exudate.   Eyes: Conjunctivae and EOM are normal. Pupils are equal, round, and reactive to light. Right eye exhibits no discharge. Left eye exhibits no discharge.   Neck: Trachea normal, normal range of motion, full passive range of motion without pain and phonation normal. Neck supple. Muscular tenderness (right sided. ) present. No spinous process tenderness present.   No meningeal signs.    Cardiovascular: Normal rate, regular rhythm, normal heart sounds and intact distal pulses.    No murmur heard.  Pulmonary/Chest: " Effort normal and breath sounds normal.   Musculoskeletal: Normal range of motion.        Cervical back: He exhibits tenderness (right side of neck with point tenderness and muscle tightness noted. ) and pain (right side of neck. ). He exhibits normal range of motion, no bony tenderness, no swelling, no edema, no deformity, no laceration, no spasm and normal pulse.   Lymphadenopathy:     He has no cervical adenopathy.   Neurological: He is alert and oriented to person, place, and time. He has normal strength and normal reflexes. No cranial nerve deficit or sensory deficit. Gait normal. GCS eye subscore is 4. GCS verbal subscore is 5. GCS motor subscore is 6.   Skin: Skin is warm and dry. Rash noted. Rash is vesicular. He is not diaphoretic.        Few scabbed vesicles noted to the back.    Psychiatric: He has a normal mood and affect. His behavior is normal. Judgment and thought content normal.       ED Course     ED Course     Procedures              Critical Care time:  none               No results found for this or any previous visit (from the past 24 hour(s)).    Medications - No data to display    Assessments & Plan (with Medical Decision Making)     I have reviewed the nursing notes.    I have reviewed the findings, diagnosis, plan and need for follow up with the patient.     Patient informed of viral etiology and treatment discussed in office.   Patient contagious until all vesicles scabbed over. Patient to avoid pregnant women, the immunocompromised, the very young and elderly.     Use medication as directed; caution with flagyl can cause sedation.     Follow up with PCP in 1-2 weeks if rash resolves and neck pain persists.    Go to Emergency Room if sx worsen or change, numbness in arms, neck stiffness, worst headache, visual changes, confusion, arm weakness occurs  May use acetaminophen, ibuprofen as needed for pain.     No secondary cellulitis noted.    Patient voiced understanding of instructions given.    Discharge Medication List as of 8/6/2018  3:02 PM      START taking these medications    Details   acyclovir (ZOVIRAX) 800 MG tablet Take 1 tablet (800 mg) by mouth 5 times daily, Disp-35 tablet, R-0, E-Prescribe      cyclobenzaprine (FLEXERIL) 10 MG tablet Take 1 tablet (10 mg) by mouth 2 times daily as needed for muscle spasms, Disp-15 tablet, R-0, E-Prescribe             Final diagnoses:   Herpes zoster without complication   Myalgia       8/6/2018   [facility] EMERGENCY DEPARTMENT     Haley Page, RIKY  08/07/18 0907

## 2018-08-06 NOTE — ED AVS SNAPSHOT
[facility] Emergency Department    [address] [address]    Phone:  [phone]    Fax:  [phone]                                       Gabe Murillo   MRN: [id number]    Department:  [facility] Emergency Department   Date of Visit:  8/6/2018           Patient Information     Date Of Birth          1980        Your diagnoses for this visit were:     Herpes zoster without complication     Myalgia        You were seen by Haley Page PA-C.      Follow-up Information     Follow up with Taylor Damon MD In 1 week.    Specialty:  Family Practice    Why:  As needed    Contact information:    [address]  [facility] [address]  [phone]          Follow up with [facility] Emergency Department.    Specialty:  EMERGENCY MEDICINE    Why:  As needed, If symptoms worsen    Contact information:    [address] [place] [id number] [phone]    Additional information:    The medical center is located at   [address]. (between [address]   [address] in [place], [place] miles north   of [place]).        Discharge Instructions       Patient informed of viral etiology and treatment discussed in office.   Patient contagious until all vesicles scabbed over. Patient to avoid pregnant women, the immunocompromised, the very young and elderly.     Use medication as directed; caution with flagyl can cause sedation.     Follow up with PCP in 1-2 weeks if rash resolves and neck pain persists.    Go to Emergency Room if sx worsen or change, numbness in arms, neck stiffness, worst headache, visual changes, confusion, arm weakness occurs  May use acetaminophen, ibuprofen as needed for pain.     Patient voiced understanding of instructions given.              24 Hour Appointment Hotline       To make an appointment at any [facility] clinic, call [phone] ([phone]). If you don't have a family doctor or clinic, we will help you find one. [facility]  clinics are conveniently located to serve the needs of you and your family.             Review of your medicines      START taking        Dose / Directions Last dose taken    acyclovir 800 MG tablet   Commonly known as:  ZOVIRAX   Dose:  800 mg   Quantity:  35 tablet        Take 1 tablet (800 mg) by mouth 5 times daily   Refills:  0        cyclobenzaprine 10 MG tablet   Commonly known as:  FLEXERIL   Dose:  10 mg   Quantity:  15 tablet        Take 1 tablet (10 mg) by mouth 2 times daily as needed for muscle spasms   Refills:  0                Prescriptions were sent or printed at these locations (2 Prescriptions)                   [facility] [place], [place] - [address]   [address] [address]    Telephone:  [phone]   Fax:  [phone]   Hours:                  E-Prescribed (2 of 2)         acyclovir (ZOVIRAX) 800 MG tablet               cyclobenzaprine (FLEXERIL) 10 MG tablet                Orders Needing Specimen Collection     None      Pending Results     No orders found from 8/4/2018 to 8/7/2018.            Pending Culture Results     No orders found from 8/4/2018 to 8/7/2018.            Pending Results Instructions     If you had any lab results that were not finalized at the time of your Discharge, you can call the ED Lab Result RN at [phone]. You will be contacted by this team for any positive Lab results or changes in treatment. The nurses are available 7 days a week from 10A to 6:30P.  You can leave a message 24 hours per day and they will return your call.        Test Results From Your Hospital Stay               Thank you for choosing [facility]       Thank you for choosing [facility] for your care. Our goal is always to provide you with excellent care. Hearing back from our patients is one way we can continue to improve our services. Please take a few minutes to complete the written survey that you may receive in the mail after you visit with us. Thank you!         [employer] (Inspect Manager) Information     [employer] ([employer]) gives you secure access to your electronic health record. If you see a primary care provider, you can also send messages to your care team and make appointments. If you have questions, please call your primary care clinic.  If you do not have a primary care provider, please call [phone] and they will assist you.        Care EveryWhere ID     This is your Care EveryWhere ID. This could be used by other organizations to access your [facility] medical records  [id number]        Equal Access to Services     MIREILLE GRIMM : Hadarnoldo schaeffero Soleon, waaxda luqadaha, qaybta kaalmada delon, [place]. So [facility] [phone].    ATENCIÓN: Si habla español, tiene a grimm disposición servicios gratuitos de asistencia lingüística. Llame al [phone].    We comply with applicable federal civil rights laws and [place] laws. We do not discriminate on the basis of race, color, national origin, age, disability, sex, sexual orientation, or gender identity.            After Visit Summary       This is your record. Keep this with you and show to your community pharmacist(s) and doctor(s) at your next visit.

## 2018-08-06 NOTE — ED TRIAGE NOTES
Patient here for rash on the L shoulder - started after ultrasound treatment for neck pain.  Patient presents ambulatory to the urgent care.

## 2018-08-07 ENCOUNTER — OFFICE VISIT (OUTPATIENT)
Dept: FAMILY MEDICINE | Facility: CLINIC | Age: 38
End: 2018-08-07
Payer: COMMERCIAL

## 2018-08-07 VITALS
HEIGHT: 70 IN | DIASTOLIC BLOOD PRESSURE: 84 MMHG | HEART RATE: 74 BPM | RESPIRATION RATE: 20 BRPM | OXYGEN SATURATION: 98 % | TEMPERATURE: 97.1 F | SYSTOLIC BLOOD PRESSURE: 136 MMHG | BODY MASS INDEX: 30.92 KG/M2 | WEIGHT: 216 LBS

## 2018-08-07 DIAGNOSIS — B02.9 HERPES ZOSTER WITHOUT COMPLICATION: Primary | ICD-10-CM

## 2018-08-07 DIAGNOSIS — F41.9 ANXIETY: ICD-10-CM

## 2018-08-07 PROCEDURE — 99214 OFFICE O/P EST MOD 30 MIN: CPT | Performed by: NURSE PRACTITIONER

## 2018-08-07 RX ORDER — HYDROCODONE BITARTRATE AND ACETAMINOPHEN 5; 325 MG/1; MG/1
1-2 TABLET ORAL
Qty: 20 TABLET | Refills: 0 | Status: SHIPPED | OUTPATIENT
Start: 2018-08-07 | End: 2020-01-07

## 2018-08-07 RX ORDER — ESCITALOPRAM OXALATE 10 MG/1
10 TABLET ORAL DAILY
Qty: 30 TABLET | Refills: 1 | Status: SHIPPED | OUTPATIENT
Start: 2018-08-07 | End: 2018-09-07

## 2018-08-07 ASSESSMENT — ANXIETY QUESTIONNAIRES
7. FEELING AFRAID AS IF SOMETHING AWFUL MIGHT HAPPEN: SEVERAL DAYS
GAD7 TOTAL SCORE: 14
5. BEING SO RESTLESS THAT IT IS HARD TO SIT STILL: SEVERAL DAYS
2. NOT BEING ABLE TO STOP OR CONTROL WORRYING: MORE THAN HALF THE DAYS
3. WORRYING TOO MUCH ABOUT DIFFERENT THINGS: NEARLY EVERY DAY
1. FEELING NERVOUS, ANXIOUS, OR ON EDGE: NEARLY EVERY DAY
6. BECOMING EASILY ANNOYED OR IRRITABLE: NEARLY EVERY DAY

## 2018-08-07 ASSESSMENT — PATIENT HEALTH QUESTIONNAIRE - PHQ9: 5. POOR APPETITE OR OVEREATING: SEVERAL DAYS

## 2018-08-07 ASSESSMENT — ENCOUNTER SYMPTOMS
FATIGUE: 0
NECK STIFFNESS: 0
LIGHT-HEADEDNESS: 0
FEVER: 0
HEADACHES: 1
NUMBNESS: 0
WEAKNESS: 0

## 2018-08-07 NOTE — PROGRESS NOTES
SUBJECTIVE:   Gabe Murillo is a 38 year old male who presents to clinic today for the following health issues:  Chief Complaint   Patient presents with     Shingles     urgent care follow up for shingles      UC Follow-Up     Anxiety         ED/UC Followup:  Facility:  [facility]   Date of visit: 08/06/2018  Reason for visit: nerve pain in neck-   Current Status: rates pain at 5/10  At night rates pain at 9/10  . Unable to sleep at night     Tried the flexeril last night and was not effective.        He has been having problems with pain in his right neck for a week and the chiropractor was not helping.  He then developed a rash.  Gabe was seen in UC yesterday and he was diagnosed with shingles.  He has been taking ibuprofen for the pain.  He was given a muscle relaxant but it didn't help.  He continues to have signifciant pain.    The rash on his right neck is large, blistery/scabbed.  The rash did seem a little bigger today.  He is taking his acyclovir as prescribed.    No fever or chills.  He is eating and drinking normally.  He worked today:      Mood:  Gabe has been having problems with anxiety and depression related to his job stress.  He has had problems with anxiety in the past and was prescribed citalopram.  He stopped the citalopram because it gave him  Side effects that he did not like, he felt flat.  Today, his mood is worsening.  He denies feeling suicidal.  His wife is worried about him because he is irritable and anxious.  He does not sleep well at night with his thoughts racing.  He would like to start meds today if possible.    Problem list and histories reviewed & adjusted, as indicated.  Additional history: as documented    Patient Active Problem List   Diagnosis     Genital warts     CARDIOVASCULAR SCREENING; LDL GOAL LESS THAN 130     Anxiety     Past Surgical History:   Procedure Laterality Date     EXCISE GANGLION WRIST  3/9/2012    Procedure:EXCISE GANGLION WRIST; Excise Left Dorsal  "Ganglion with post interosseous neurectomy- choice Anesthesia; Surgeon:BEULAH GARCIA; Location:[facility] OR     FRACTURE TX, WRIST RT/LT  2001    Fracture TX Wrist RT     VASECTOMY Bilateral 09/28/2017    No scalpel technique     wisdom teeth         Social History   Substance Use Topics     Smoking status: Never Smoker     Smokeless tobacco: Never Used     Alcohol use Yes     Family History   Problem Relation Age of Onset     Anxiety Disorder Mother      Anxiety Disorder Father      Arthritis Maternal Grandmother      Dementia Maternal Grandmother          Current Outpatient Prescriptions   Medication Sig Dispense Refill     acyclovir (ZOVIRAX) 800 MG tablet Take 1 tablet (800 mg) by mouth 5 times daily 35 tablet 0     cyclobenzaprine (FLEXERIL) 10 MG tablet Take 1 tablet (10 mg) by mouth 2 times daily as needed for muscle spasms 15 tablet 0     Allergies   Allergen Reactions     Pcn [Penicillin V]      Penicillin allergy - as a baby, has not had penicillin since then, unknown reaction. Tolerated ancef during surgery 2012     No lab results found.   BP Readings from Last 3 Encounters:   08/07/18 136/84   08/06/18 152/89   02/19/18 132/76    Wt Readings from Last 3 Encounters:   08/07/18 216 lb (98 kg)   08/06/18 220 lb (99.8 kg)   02/19/18 215 lb (97.5 kg)            Labs reviewed in EPIC    Reviewed and updated as needed this visit by clinical staff       Reviewed and updated as needed this visit by Provider         ROS:  Constitutional, HEENT, cardiovascular, pulmonary, GI, , musculoskeletal, neuro, skin, endocrine and psych systems are negative, except as otherwise noted.    OBJECTIVE:     /84  Pulse 74  Temp 97.1  F (36.2  C) (Oral)  Resp 20  Ht 5' 10\" (1.778 m)  Wt 216 lb (98 kg)  SpO2 98%  BMI 30.99 kg/m2  Body mass index is 30.99 kg/(m^2).    GENERAL APPEARANCE: healthy, alert and no distress. Smiling.   SKIN: warm and dry.  There is a large, erythematous blistery patches and some are scabbed on " his right lateral neck and upper shoulder.  This rash does radiate [place] down his posterior right shoulder blade as well as below his clavicle on his right anterior chest.  PSYCH: mentation appears normal and affect normal/bright.    He offers information freely.  Good eye contact.      ASSESSMENT/PLAN:     (B02.9) Herpes zoster without complication  (primary encounter diagnosis)  Comment: Acute  Plan: HYDROcodone-acetaminophen (NORCO) 5-325 MG per         tablet        I did talk with Gabe about taking the acyclovir as prescribed.  I encouraged him to take ibuprofen 600mg TID with food for the next 5-10 days.  He can take the hydrocodone at night time for sleep as prescribed. I did tell him that this Rx was for short term use only and additional refills would only be considered with a face to face appointment.  He will plan to follow up at [facility] (near his home).  He was appreciative.    (F41.9) Anxiety  Comment: worse  Plan: escitalopram (LEXAPRO) 10 MG tablet        I did talk with Gabe at length about medication management options, the citalopram, and today I did prescribe lexapro.  Since his symptoms have been stable and are not acutely worse, I did encourage him Read the side effect profile that comes with the medication.  I also asked him to wait a week to start the lexapro since he is taking the acyclovir, norco, ibup, flexeril etc.  He was appreciative and agrees.  He is to follow up with his PCP in 1-2 months for additional refills/a med check appointment, sooner if problems.    I spent a total of 30 min with the patient, >50% time spent face to face counseling regarding the above issues, establishing a plan of care, and coordinating follow up care.     RAQUEL Dean [facility]

## 2018-08-07 NOTE — MR AVS SNAPSHOT
"              After Visit Summary   8/7/2018    Gabe Murillo    MRN: [id number]           Patient Information     Date Of Birth          1980        Visit Information        Provider Department      8/7/2018 2:20 PM Rona Reynaga APRN CNP [facility]        Today's Diagnoses     Herpes zoster without complication    -  1    Anxiety           Follow-ups after your visit        Who to contact     If you have questions or need follow up information about today's clinic visit or your schedule please contact [facility] directly at [phone].  Normal or non-critical lab and imaging results will be communicated to you by Equallogichart, letter or phone within 4 business days after the clinic has received the results. If you do not hear from us within 7 days, please contact the clinic through Equallogichart or phone. If you have a critical or abnormal lab result, we will notify you by phone as soon as possible.  Submit refill requests through En[employer] Interactive or call your pharmacy and they will forward the refill request to us. Please allow 3 business days for your refill to be completed.          Additional Information About Your Visit        MyChart Information     [employer] gives you secure access to your electronic health record. If you see a primary care provider, you can also send messages to your care team and make appointments. If you have questions, please call your primary care clinic.  If you do not have a primary care provider, please call [phone] and they will assist you.        Care EveryWhere ID     This is your Care EveryWhere ID. This could be used by other organizations to access your [facility] medical records  [id number]        Your Vitals Were     Pulse Temperature Respirations Height Pulse Oximetry BMI (Body Mass Index)    74 97.1  F (36.2  C) (Oral) 20 5' 10\" (1.778 m) 98% 30.99 kg/m2       Blood Pressure from Last 3 Encounters:   08/07/18 136/84   08/06/18 " 152/89   02/19/18 132/76    Weight from Last 3 Encounters:   08/07/18 216 lb (98 kg)   08/06/18 220 lb (99.8 kg)   02/19/18 215 lb (97.5 kg)              Today, you had the following     No orders found for display         Today's Medication Changes          These changes are accurate as of 8/7/18 11:59 PM.  If you have any questions, ask your nurse or doctor.               Start taking these medicines.        Dose/Directions    escitalopram 10 MG tablet   Commonly known as:  LEXAPRO   Used for:  Anxiety   Started by:  Rona Reynaga APRN CNP        Dose:  10 mg   Take 1 tablet (10 mg) by mouth daily   Quantity:  30 tablet   Refills:  1       HYDROcodone-acetaminophen 5-325 MG per tablet   Commonly known as:  NORCO   Used for:  Herpes zoster without complication   Started by:  Rona Reynaga APRN CNP        Dose:  1-2 tablet   Take 1-2 tablets by mouth nightly as needed for severe pain or other (Moderate to Severe Pain)   Quantity:  20 tablet   Refills:  0            Where to get your medicines      These medications were sent to [facility], [place] - [address] [facility]  [address] [facility] [address]     Phone:  [phone]     escitalopram 10 MG tablet         Some of these will need a paper prescription and others can be bought over the counter.  Ask your nurse if you have questions.     Bring a paper prescription for each of these medications     HYDROcodone-acetaminophen 5-325 MG per tablet               Information about OPIOIDS     PRESCRIPTION OPIOIDS: WHAT YOU NEED TO KNOW   We gave you an opioid (narcotic) pain medicine. It is important to manage your pain, but opioids are not always the best choice. You should first try all the other options your care team gave you. Take this medicine for as short a time (and as few doses) as possible.    Some activities can increase your pain, such as bandage changes or therapy sessions. It may  help to take your pain medicine 30 to 60 minutes before these activities. Reduce your stress by getting enough sleep, working on hobbies you enjoy and practicing relaxation or meditation. Talk to your care team about ways to manage your pain beyond prescription opioids.    These medicines have risks:    DO NOT drive when on new or higher doses of pain medicine. These medicines can affect your alertness and reaction times, and you could be arrested for driving under the influence (DUI). If you need to use opioids long-term, talk to your care team about driving.    DO NOT operate heavy machinery    DO NOT do any other dangerous activities while taking these medicines.    DO NOT drink any alcohol while taking these medicines.     If the opioid prescribed includes acetaminophen, DO NOT take with any other medicines that contain acetaminophen. Read all labels carefully. Look for the word  acetaminophen  or  Tylenol.  Ask your pharmacist if you have questions or are unsure.    You can get addicted to pain medicines, especially if you have a history of addiction (chemical, alcohol or substance dependence). Talk to your care team about ways to reduce this risk.    All opioids tend to cause constipation. Drink plenty of water and eat foods that have a lot of fiber, such as fruits, vegetables, prune juice, apple juice and high-fiber cereal. Take a laxative (Miralax, milk of magnesia, Colace, Senna) if you don t move your bowels at least every other day. Other side effects include upset stomach, sleepiness, dizziness, throwing up, tolerance (needing more of the medicine to have the same effect), physical dependence and slowed breathing.    Store your pills in a secure place, locked if possible. We will not replace any lost or stolen medicine. If you don t finish your medicine, please throw away (dispose) as directed by your pharmacist. The [place] Pollution Control Agency has more information about safe disposal:  https://www.pca.[facility].[place].us/living-green/managing-unwanted-medications         Primary Care Provider Office Phone # Fax #    Taylor Damon [phone] [address]  [facility] [address]        Equal Access to Services     DONTAEPHIL SIRISHA : Hadii aad ku hadasho Soomaali, waaxda luqadaha, qaybta kaalmada adeegyada, waxmartha guzman emilianon bettybirgit lee lacarito ramsey. So [facility] [phone].    ATENCIÓN: Si habla español, tiene a grimm disposición servicios gratuitos de asistencia lingüística. Jamie al [phone].    We comply with applicable federal civil rights laws and [place] laws. We do not discriminate on the basis of race, color, national origin, age, disability, sex, sexual orientation, or gender identity.            Thank you!     Thank you for choosing [facility]  for your care. Our goal is always to provide you with excellent care. Hearing back from our patients is one way we can continue to improve our services. Please take a few minutes to complete the written survey that you may receive in the mail after your visit with us. Thank you!             Your Updated Medication List - Protect others around you: Learn how to safely use, store and throw away your medicines at www.disposemymeds.org.          This list is accurate as of 8/7/18 11:59 PM.  Always use your most recent med list.                   Brand Name Dispense Instructions for use Diagnosis    acyclovir 800 MG tablet    ZOVIRAX    35 tablet    Take 1 tablet (800 mg) by mouth 5 times daily        cyclobenzaprine 10 MG tablet    FLEXERIL    15 tablet    Take 1 tablet (10 mg) by mouth 2 times daily as needed for muscle spasms        escitalopram 10 MG tablet    LEXAPRO    30 tablet    Take 1 tablet (10 mg) by mouth daily    Anxiety       HYDROcodone-acetaminophen 5-325 MG per tablet    NORCO    20 tablet    Take 1-2 tablets by mouth nightly as needed for severe pain or other (Moderate to Severe Pain)    Herpes  zoster without complication

## 2018-08-08 ASSESSMENT — ANXIETY QUESTIONNAIRES: GAD7 TOTAL SCORE: 14

## 2018-08-08 ASSESSMENT — PATIENT HEALTH QUESTIONNAIRE - PHQ9: SUM OF ALL RESPONSES TO PHQ QUESTIONS 1-9: 15

## 2018-09-07 ENCOUNTER — OFFICE VISIT (OUTPATIENT)
Dept: FAMILY MEDICINE | Facility: CLINIC | Age: 38
End: 2018-09-07
Payer: COMMERCIAL

## 2018-09-07 VITALS
TEMPERATURE: 98.2 F | HEIGHT: 70 IN | RESPIRATION RATE: 20 BRPM | SYSTOLIC BLOOD PRESSURE: 133 MMHG | WEIGHT: 217 LBS | BODY MASS INDEX: 31.07 KG/M2 | DIASTOLIC BLOOD PRESSURE: 84 MMHG | HEART RATE: 74 BPM

## 2018-09-07 DIAGNOSIS — F41.9 ANXIETY: ICD-10-CM

## 2018-09-07 PROCEDURE — 99213 OFFICE O/P EST LOW 20 MIN: CPT | Performed by: FAMILY MEDICINE

## 2018-09-07 RX ORDER — ESCITALOPRAM OXALATE 10 MG/1
10 TABLET ORAL DAILY
Qty: 90 TABLET | Refills: 3 | Status: SHIPPED | OUTPATIENT
Start: 2018-09-07 | End: 2020-01-07

## 2018-09-07 NOTE — MR AVS SNAPSHOT
"              After Visit Summary   9/7/2018    Gabe Murillo    MRN: [id number]           Patient Information     Date Of Birth          1980        Visit Information        Provider Department      9/7/2018 2:40 PM Crow Burdick MD [facility]        Today's Diagnoses     Anxiety           Follow-ups after your visit        Who to contact     If you have questions or need follow up information about today's clinic visit or your schedule please contact [facility] directly at [phone].  Normal or non-critical lab and imaging results will be communicated to you by MyChart, letter or phone within 4 business days after the clinic has received the results. If you do not hear from us within 7 days, please contact the clinic through Waizyt or phone. If you have a critical or abnormal lab result, we will notify you by phone as soon as possible.  Submit refill requests through Vendly or call your pharmacy and they will forward the refill request to us. Please allow 3 business days for your refill to be completed.          Additional Information About Your Visit        MyChart Information     [employer] gives you secure access to your electronic health record. If you see a primary care provider, you can also send messages to your care team and make appointments. If you have questions, please call your primary care clinic.  If you do not have a primary care provider, please call [phone] and they will assist you.        Care EveryWhere ID     This is your Care EveryWhere ID. This could be used by other organizations to access your [facility] medical records  [id number]        Your Vitals Were     Pulse Temperature Respirations Height BMI (Body Mass Index)       74 98.2  F (36.8  C) (Tympanic) 20 5' 10\" (1.778 m) 31.14 kg/m2        Blood Pressure from Last 3 Encounters:   09/07/18 133/84   08/07/18 136/84   08/06/18 152/89    Weight from Last 3 Encounters:   09/07/18 217 lb " (98.4 kg)   08/07/18 216 lb (98 kg)   08/06/18 220 lb (99.8 kg)              Today, you had the following     No orders found for display         Where to get your medicines      [place] [facility] [place] - [address] [facility]  [address] [facility] [address]     Phone:  [phone]     escitalopram 10 MG tablet          Primary Care Provider Office Phone # Fax #    Taylor Damon [phone] [address]  [facility] [address]        Equal Access to Services     [facility] Medical CenterSOTERO : Hadii alisson Pappas, waaxda lubenny, qaybta kaalmada delon, garrison chan . So [facility] [phone].    ATENCIÓN: Si habla español, tiene a grimm disposición servicios gratuitos de asistencia lingüística. Llame al [phone].    We comply with applicable federal civil rights laws and [place] laws. We do not discriminate on the basis of race, color, national origin, age, disability, sex, sexual orientation, or gender identity.            Thank you!     Thank you for choosing [facility]  for your care. Our goal is always to provide you with excellent care. Hearing back from our patients is one way we can continue to improve our services. Please take a few minutes to complete the written survey that you may receive in the mail after your visit with us. Thank you!             Your Updated Medication List - Protect others around you: Learn how to safely use, store and throw away your medicines at www.disposemymeds.org.          This list is accurate as of 9/7/18  3:14 PM.  Always use your most recent med list.                   Brand Name Dispense Instructions for use Diagnosis    acyclovir 800 MG tablet    ZOVIRAX    35 tablet    Take 1 tablet (800 mg) by mouth 5 times daily        cyclobenzaprine 10 MG tablet    FLEXERIL    15 tablet    Take 1 tablet (10 mg) by mouth 2 times daily as  needed for muscle spasms        escitalopram 10 MG tablet    LEXAPRO    90 tablet    Take 1 tablet (10 mg) by mouth daily    Anxiety       HYDROcodone-acetaminophen 5-325 MG per tablet    NORCO    20 tablet    Take 1-2 tablets by mouth nightly as needed for severe pain or other (Moderate to Severe Pain)    Herpes zoster without complication

## 2018-09-07 NOTE — PROGRESS NOTES
"  SUBJECTIVE:   Gabe Murillo is a 38 year old male who presents to clinic today for the following health issues:      Anxiety Follow-Up    Status since last visit: Improved slightly    Other associated symptoms:anxious and short, concentration    Complicating factors:   Significant life event: No   Current substance abuse: None  Depression symptoms: No  MILEY-7 SCORE 9/21/2015 1/26/2016 8/7/2018   Total Score 12 12 14       MILEY-7    Amount of exercise or physical activity: 6-7 days/week for an average of greater than 60 minutes    Problems taking medications regularly: No    Medication side effects: none    Diet: regular (no restrictions)            Problem list and histories reviewed & adjusted, as indicated.  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  He was started on Lexapro about a month ago when he says it helped a fair amount but he still is somewhat anxious and short with his family.  He denies suicide ideation.    OBJECTIVE:  /84  Pulse 74  Temp 98.2  F (36.8  C) (Tympanic)  Resp 20  Ht 5' 10\" (1.778 m)  Wt 217 lb (98.4 kg)  BMI 31.14 kg/m2  LUNGS: clear to auscultation, normal breath sounds  CV: RRR without murmur  ABD: BS+, soft, nontender, no masses, no hepatosplenomegaly  EXTREMITIES: without joint tenderness, swelling or erythema.  No muscle tenderness or abnormality.  SKIN: No rashes or abnormalities  NEURO:non focal exam    ASSESSMENT:  Anxiety    PLAN:  I counseled him at some length about relaxation techniques and ways to mitigate or relieve anxiety  Return as needed.    Orders Placed This Encounter     escitalopram (LEXAPRO) 10 MG tablet       "

## 2019-01-11 ENCOUNTER — OFFICE VISIT (OUTPATIENT)
Dept: SLEEP MEDICINE | Facility: CLINIC | Age: 39
End: 2019-01-11
Payer: COMMERCIAL

## 2019-01-11 VITALS
BODY MASS INDEX: 30.85 KG/M2 | SYSTOLIC BLOOD PRESSURE: 132 MMHG | DIASTOLIC BLOOD PRESSURE: 84 MMHG | WEIGHT: 227.8 LBS | OXYGEN SATURATION: 96 % | HEIGHT: 72 IN | HEART RATE: 84 BPM

## 2019-01-11 DIAGNOSIS — R06.81 APNEA: ICD-10-CM

## 2019-01-11 DIAGNOSIS — Z82.0 FAMILY HISTORY OF SLEEP APNEA: ICD-10-CM

## 2019-01-11 DIAGNOSIS — E66.09 CLASS 1 OBESITY DUE TO EXCESS CALORIES WITHOUT SERIOUS COMORBIDITY WITH BODY MASS INDEX (BMI) OF 31.0 TO 31.9 IN ADULT: ICD-10-CM

## 2019-01-11 DIAGNOSIS — E66.811 CLASS 1 OBESITY DUE TO EXCESS CALORIES WITHOUT SERIOUS COMORBIDITY WITH BODY MASS INDEX (BMI) OF 31.0 TO 31.9 IN ADULT: ICD-10-CM

## 2019-01-11 DIAGNOSIS — G47.10 HYPERSOMNIA: Primary | ICD-10-CM

## 2019-01-11 DIAGNOSIS — R06.83 SNORING: ICD-10-CM

## 2019-01-11 LAB
ALBUMIN SERPL-MCNC: 4.1 G/DL (ref 3.4–5)
ALP SERPL-CCNC: 74 U/L (ref 40–150)
ALT SERPL W P-5'-P-CCNC: 43 U/L (ref 0–70)
ANION GAP SERPL CALCULATED.3IONS-SCNC: 6 MMOL/L (ref 3–14)
AST SERPL W P-5'-P-CCNC: 24 U/L (ref 0–45)
BASOPHILS # BLD AUTO: 0 10E9/L (ref 0–0.2)
BASOPHILS NFR BLD AUTO: 0.5 %
BILIRUB SERPL-MCNC: 0.4 MG/DL (ref 0.2–1.3)
BUN SERPL-MCNC: 16 MG/DL (ref 7–30)
CALCIUM SERPL-MCNC: 8.9 MG/DL (ref 8.5–10.1)
CHLORIDE SERPL-SCNC: 103 MMOL/L (ref 94–109)
CHOLEST SERPL-MCNC: 237 MG/DL
CO2 SERPL-SCNC: 28 MMOL/L (ref 20–32)
CREAT SERPL-MCNC: 1.05 MG/DL (ref 0.66–1.25)
DIFFERENTIAL METHOD BLD: NORMAL
EOSINOPHIL # BLD AUTO: 0.2 10E9/L (ref 0–0.7)
EOSINOPHIL NFR BLD AUTO: 1.9 %
ERYTHROCYTE [DISTWIDTH] IN BLOOD BY AUTOMATED COUNT: 12.8 % (ref 10–15)
GFR SERPL CREATININE-BSD FRML MDRD: >90 ML/MIN/{1.73_M2}
GLUCOSE SERPL-MCNC: 90 MG/DL (ref 70–99)
HCT VFR BLD AUTO: 42.5 % (ref 40–53)
HDLC SERPL-MCNC: 46 MG/DL
HGB BLD-MCNC: 14.5 G/DL (ref 13.3–17.7)
LDLC SERPL CALC-MCNC: 150 MG/DL
LYMPHOCYTES # BLD AUTO: 1.7 10E9/L (ref 0.8–5.3)
LYMPHOCYTES NFR BLD AUTO: 20.9 %
MCH RBC QN AUTO: 29.4 PG (ref 26.5–33)
MCHC RBC AUTO-ENTMCNC: 34.1 G/DL (ref 31.5–36.5)
MCV RBC AUTO: 86 FL (ref 78–100)
MONOCYTES # BLD AUTO: 1.2 10E9/L (ref 0–1.3)
MONOCYTES NFR BLD AUTO: 15.1 %
NEUTROPHILS # BLD AUTO: 4.9 10E9/L (ref 1.6–8.3)
NEUTROPHILS NFR BLD AUTO: 61.6 %
NONHDLC SERPL-MCNC: 191 MG/DL
PLATELET # BLD AUTO: 205 10E9/L (ref 150–450)
POTASSIUM SERPL-SCNC: 4 MMOL/L (ref 3.4–5.3)
PROT SERPL-MCNC: 7.5 G/DL (ref 6.8–8.8)
RBC # BLD AUTO: 4.94 10E12/L (ref 4.4–5.9)
SODIUM SERPL-SCNC: 137 MMOL/L (ref 133–144)
TRIGL SERPL-MCNC: 205 MG/DL
TSH SERPL DL<=0.005 MIU/L-ACNC: 0.72 MU/L (ref 0.4–4)
WBC # BLD AUTO: 7.9 10E9/L (ref 4–11)

## 2019-01-11 PROCEDURE — 36415 COLL VENOUS BLD VENIPUNCTURE: CPT | Performed by: FAMILY MEDICINE

## 2019-01-11 PROCEDURE — 80053 COMPREHEN METABOLIC PANEL: CPT | Performed by: FAMILY MEDICINE

## 2019-01-11 PROCEDURE — 85025 COMPLETE CBC W/AUTO DIFF WBC: CPT | Performed by: FAMILY MEDICINE

## 2019-01-11 PROCEDURE — 80061 LIPID PANEL: CPT | Performed by: FAMILY MEDICINE

## 2019-01-11 PROCEDURE — 84443 ASSAY THYROID STIM HORMONE: CPT | Performed by: FAMILY MEDICINE

## 2019-01-11 PROCEDURE — 99205 OFFICE O/P NEW HI 60 MIN: CPT | Performed by: FAMILY MEDICINE

## 2019-01-11 PROCEDURE — 82306 VITAMIN D 25 HYDROXY: CPT | Performed by: FAMILY MEDICINE

## 2019-01-11 ASSESSMENT — MIFFLIN-ST. JEOR: SCORE: 1997.32

## 2019-01-11 NOTE — PROGRESS NOTES
"  Sleep Consultation:    Date on this visit: 1/11/2019    Gabe Murillo is self-referred found for a sleep consultation regarding chronic daytime fatigue.    Primary Physician: Taylor Damon     Chief Complaint: \"I am always tired.\"    HPI:  Gabe Murillo is a pleasant 39 yo M with history of anxiety who is self-referred for chronic daytime fatigue.  He is alone for this visit today.    He presents today due to working daytime fatigue, AM headaches, snoring, observed apnea.  He feels his fatigue has worsened over the past 1-2 years and attributes it to gaining ~20 lbs.    Family history of mother with VALERIA and DM II, paternal grandmother with elevated cholesterol, maternal uncle with DM I.    On work nights, in bed around 10pm and falls asleep in seconds, ~3 brief awakenings, up by alarm at 5am.  Weekends, 11pm - 7am.  No planned naps, but will often fall asleep if watching TV.    Gabe denies any sleep walking and dream enactment.    Patient's [place] Sleepiness score 20/24 consistent with excessive  daytime sleepiness.      He feels that life stressors have been stable, new business is going well.    Does not smoke.  Caffeine of 1-2 energy drinks per day.  EtOH of ~2 drinks / week.  Denies other drug use, prior injectable drug use.    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    SHx:  , two children ages 4 and 6.  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    Allergies:    Allergies   Allergen Reactions     Pcn [Penicillin V]      Penicillin allergy - as a baby, has not had penicillin since then, unknown reaction. Tolerated ancef during surgery 2012       Medications:    Current Outpatient Medications   Medication Sig Dispense Refill     acyclovir (ZOVIRAX) 800 MG tablet Take 1 tablet (800 mg) by mouth 5 times daily (Patient not taking: Reported on 9/7/2018) 35 tablet 0     cyclobenzaprine (FLEXERIL) 10 MG tablet Take 1 tablet (10 mg) by " mouth 2 times daily as needed for muscle spasms (Patient not taking: Reported on 9/7/2018) 15 tablet 0     escitalopram (LEXAPRO) 10 MG tablet Take 1 tablet (10 mg) by mouth daily (Patient not taking: Reported on 1/11/2019) 90 tablet 3     HYDROcodone-acetaminophen (NORCO) 5-325 MG per tablet Take 1-2 tablets by mouth nightly as needed for severe pain or other (Moderate to Severe Pain) (Patient not taking: Reported on 9/7/2018) 20 tablet 0       Problem List:  Patient Active Problem List    Diagnosis Date Noted     Anxiety 09/29/2015     Priority: Medium     CARDIOVASCULAR SCREENING; LDL GOAL LESS THAN 130 08/05/2014     Priority: Medium     Genital warts 01/26/2011     Priority: Medium        Past Medical/Surgical History:  No past medical history on file.  Past Surgical History:   Procedure Laterality Date     EXCISE GANGLION WRIST  3/9/2012    Procedure:EXCISE GANGLION WRIST; Excise Left Dorsal Ganglion with post interosseous neurectomy- choice Anesthesia; Surgeon:BEULAH GARCIA; Location:[place] [facility]     FRACTURE TX, WRIST RT/LT  2001    Fracture TX Wrist RT     VASECTOMY Bilateral 09/28/2017    No scalpel technique     wisdom teeth         Social History:  Social History     Socioeconomic History     Marital status:      Spouse name: Not on file     Number of children: Not on file     Years of education: Not on file     Highest education level: Not on file   Social Needs     Financial resource strain: Not on file     Food insecurity - worry: Not on file     Food insecurity - inability: Not on file     Transportation needs - medical: Not on file     Transportation needs - non-medical: Not on file   Occupational History     Not on file   Tobacco Use     Smoking status: Never Smoker     Smokeless tobacco: Never Used   Substance and Sexual Activity     Alcohol use: Yes     Drug use: No     Sexual activity: Yes     Partners: Female     Birth control/protection: Condom   Other Topics Concern     Parent/sibling w/  "CABG, MI or angioplasty before 65F 55M? No   Social History Narrative     Not on file       Family History:  Family History   Problem Relation Age of Onset     Anxiety Disorder Mother      Anxiety Disorder Father      Arthritis Maternal Grandmother      Dementia Maternal Grandmother        Review of Systems:  A complete review of systems reviewed by me is negative with the exeption of what has been mentioned in the history of present illness.  CONSTITUTIONAL:  POSITIVE for  weight gain and drug allergies      Allergies   Allergen Reactions     Pcn [Penicillin V]      Penicillin allergy - as a baby, has not had penicillin since then, unknown reaction. Tolerated ancef during surgery 2012      EYES:  POSITIVE for eyes throb in the mornings  ENT:  POSITIVE for  sore throat  CARDIAC: NEGATIVE for fast heartbeats or fluttering in chest, chest pain or pressure, breathlessness when lying flat, swollen legs or swollen feet.  NEUROLOGIC:  POSITIVE for  headaches  DERMATOLOGIC: NEGATIVE for rashes, new moles or change in mole(s)  PULMONARY:  POSITIVE for  SOB with activity and dry cough  GASTROINTESTINAL: NEGATIVE for nausea or vomitting, loose or watery stools, fat or grease in stools, constipation, abdominal pain, bowel movements black in color or blood noted.  GENITOURINARY: NEGATIVE for pain during urination, blood in urine, urinating more frequently than usual, irregular menstrual periods.  MUSCULOSKELETAL: NEGATIVE for muscle pain, bone or joint pain, swollen joints.  ENDOCRINE: NEGATIVE for increased thirst or urination, diabetes.  LYMPHATIC:  NEGATIVE for  swollen lymph nodes, lumps or bumps in breasts and nipple discharge  MENTAL HEALTH: POSITIVE for depression and anxiety      Physical Examination:  Vitals: /84   Pulse 84   Ht 1.822 m (5' 11.75\")   Wt 103.3 kg (227 lb 12.8 oz)   SpO2 96%   BMI 31.11 kg/m     BMI= Body mass index is 31.11 kg/m .         Vandiver Total Score 1/11/2019   Total score - Vandiver " 20            GENERAL APPEARANCE: healthy, alert, no distress and over weight  EYES: Eyes grossly normal to inspection, fundi benign-no diabetic or hypertensive changes seen, PERRL and conjunctivae and sclerae normal  HENT: ear canals and TM's normal and nose and mouth without ulcers or lesions  NECK: no adenopathy, no asymmetry, masses, or scars and thyroid normal to palpation  RESP: lungs clear to auscultation - no rales, rhonchi or wheezes  CV: regular rates and rhythm, normal S1 S2, no S3 or S4 and no murmur, click or rub  NEURO: Normal strength and tone, mentation intact, speech normal, DTR symmetrically normal in lower extremities, cranial nerves 2-12 intact, Romberg negative, proprioception normal and normal strength throughout  PSYCH: mentation appears normal and anxious      Impression/Plan:    Gabe Murillo is a pleasant 39 yo M with history of anxiety who is self-referred for chronic daytime fatigue.    1.)  High probability of VALERIA   - STOP-BANG score of 4+.   - Will schedule sleep study to fully evaluate for VALERIA and possible treatment.  Will see patient after the study.   - Will check baseline TSH, CBC, CMP, vitamin D, lipid panel    Plan as given to patient as above.    I have spent 60 minutes with this patient today in which greater than 50% of this time was spent in the counseling / coordination of care regarding VALERIA.    Polysomnography reviewed.  Obstructive sleep apnea reviewed.  Complications of untreated sleep apnea were reviewed.    Brannon Cheema MD    CC: No ref. provider found

## 2019-01-13 ENCOUNTER — OFFICE VISIT (OUTPATIENT)
Dept: URGENT CARE | Facility: URGENT CARE | Age: 39
End: 2019-01-13
Payer: COMMERCIAL

## 2019-01-13 VITALS
HEART RATE: 94 BPM | TEMPERATURE: 99.3 F | SYSTOLIC BLOOD PRESSURE: 138 MMHG | BODY MASS INDEX: 30.32 KG/M2 | DIASTOLIC BLOOD PRESSURE: 79 MMHG | WEIGHT: 222 LBS | OXYGEN SATURATION: 95 % | RESPIRATION RATE: 18 BRPM

## 2019-01-13 DIAGNOSIS — R07.0 THROAT PAIN: ICD-10-CM

## 2019-01-13 DIAGNOSIS — J32.9 SINOBRONCHITIS: Primary | ICD-10-CM

## 2019-01-13 DIAGNOSIS — J40 SINOBRONCHITIS: Primary | ICD-10-CM

## 2019-01-13 LAB — DEPRECATED CALCIDIOL+CALCIFEROL SERPL-MC: 31 UG/L (ref 20–75)

## 2019-01-13 PROCEDURE — 99214 OFFICE O/P EST MOD 30 MIN: CPT | Performed by: PHYSICIAN ASSISTANT

## 2019-01-13 RX ORDER — PREDNISONE 20 MG/1
20 TABLET ORAL DAILY
Qty: 5 TABLET | Refills: 0 | Status: SHIPPED | OUTPATIENT
Start: 2019-01-13 | End: 2019-01-18

## 2019-01-13 RX ORDER — AZITHROMYCIN 250 MG/1
TABLET, FILM COATED ORAL
Qty: 6 TABLET | Refills: 0 | Status: SHIPPED | OUTPATIENT
Start: 2019-01-13 | End: 2020-01-07

## 2019-01-13 RX ORDER — ALBUTEROL SULFATE 90 UG/1
AEROSOL, METERED RESPIRATORY (INHALATION)
Qty: 1 INHALER | Refills: 0 | Status: SHIPPED | OUTPATIENT
Start: 2019-01-13 | End: 2020-01-07

## 2019-01-13 ASSESSMENT — ENCOUNTER SYMPTOMS
UNEXPECTED WEIGHT CHANGE: 0
FATIGUE: 0
CHILLS: 0
CONSTIPATION: 0
RHINORRHEA: 0
SHORTNESS OF BREATH: 0
COUGH: 1
PALPITATIONS: 0
SINUS PAIN: 1
ABDOMINAL PAIN: 0
SINUS PRESSURE: 1
ARTHRALGIAS: 0
DIARRHEA: 0
VOMITING: 0
WHEEZING: 1
EYE DISCHARGE: 0
SORE THROAT: 1
MYALGIAS: 0
FEVER: 1
EYE ITCHING: 0
EYE REDNESS: 0
EYE PAIN: 0
NAUSEA: 0

## 2019-01-13 NOTE — PROGRESS NOTES
SUBJECTIVE:   Gabe Murillo is a 38 year old male presenting with a chief complaint of   Chief Complaint   Patient presents with     Pharyngitis     1+ week, not exposed to strep that he is aware of, headache, cough- non productive, nausea, dizziness, fever        He is an established patient of [facility].    URI Adult    Onset of symptoms was 1+ week ago.  Course of illness is worsening.    Severity moderate  Current and Associated symptoms: fever, cough - non-productive, sinus pain/pressure, and sore throat  Treatment measures tried include Tylenol/Ibuprofen.  Predisposing factors include None.      Review of Systems   Constitutional: Positive for fever. Negative for chills, fatigue and unexpected weight change.   HENT: Positive for sinus pressure, sinus pain and sore throat. Negative for congestion, ear pain and rhinorrhea.    Eyes: Negative for pain, discharge, redness and itching.   Respiratory: Positive for cough and wheezing. Negative for shortness of breath.    Cardiovascular: Negative for chest pain, palpitations and leg swelling.   Gastrointestinal: Negative for abdominal pain, constipation, diarrhea, nausea and vomiting.   Musculoskeletal: Negative for arthralgias and myalgias.   Skin: Negative for rash.       History reviewed. No pertinent past medical history.  Family History   Problem Relation Age of Onset     Anxiety Disorder Mother      Anxiety Disorder Father      Arthritis Maternal Grandmother      Dementia Maternal Grandmother      Current Outpatient Medications   Medication Sig Dispense Refill     albuterol (PROAIR HFA/PROVENTIL HFA/VENTOLIN HFA) 108 (90 Base) MCG/ACT inhaler Inhale 2 puffs every 4-6 hours as needed for cough, wheezing, or shortness of breath 1 Inhaler 0     azithromycin (ZITHROMAX) 250 MG tablet Two tablets first day, then one tablet daily for four days. 6 tablet 0     predniSONE (DELTASONE) 20 MG tablet Take 20 mg by mouth daily for 5 days. 5 tablet 0     acyclovir (ZOVIRAX) 800  MG tablet Take 1 tablet (800 mg) by mouth 5 times daily (Patient not taking: Reported on 9/7/2018) 35 tablet 0     cyclobenzaprine (FLEXERIL) 10 MG tablet Take 1 tablet (10 mg) by mouth 2 times daily as needed for muscle spasms (Patient not taking: Reported on 9/7/2018) 15 tablet 0     escitalopram (LEXAPRO) 10 MG tablet Take 1 tablet (10 mg) by mouth daily (Patient not taking: Reported on 1/11/2019) 90 tablet 3     HYDROcodone-acetaminophen (NORCO) 5-325 MG per tablet Take 1-2 tablets by mouth nightly as needed for severe pain or other (Moderate to Severe Pain) (Patient not taking: Reported on 9/7/2018) 20 tablet 0     Social History     Tobacco Use     Smoking status: Never Smoker     Smokeless tobacco: Never Used   Substance Use Topics     Alcohol use: Yes       OBJECTIVE  /79   Pulse 94   Temp 99.3  F (37.4  C) (Tympanic)   Resp 18   Wt 100.7 kg (222 lb)   SpO2 95%   BMI 30.32 kg/m      Physical Exam   Constitutional: He appears well-developed and well-nourished. No distress.   HENT:   Head: Normocephalic and atraumatic.   Right Ear: Tympanic membrane and ear canal normal.   Left Ear: Tympanic membrane and ear canal normal.   Nose: Mucosal edema and rhinorrhea present.   Mouth/Throat: Posterior oropharyngeal erythema present. No oropharyngeal exudate.   Eyes: Conjunctivae are normal. Pupils are equal, round, and reactive to light.   Cardiovascular: Normal rate and regular rhythm.   Pulmonary/Chest: Effort normal and breath sounds normal.   Frequent dry reactive cough   Skin: Skin is warm and dry. No rash noted.   Psychiatric: He has a normal mood and affect. His behavior is normal.       Labs:  No results found for this or any previous visit (from the past 24 hour(s)).    X-Ray was not done.    ASSESSMENT:      ICD-10-CM    1. Sinobronchitis J32.9 azithromycin (ZITHROMAX) 250 MG tablet    J40 albuterol (PROAIR HFA/PROVENTIL HFA/VENTOLIN HFA) 108 (90 Base) MCG/ACT inhaler     predniSONE (DELTASONE) 20  MG tablet   2. Throat pain R07.0 CANCELED: Strep, Rapid Screen        Medical Decision Making:    Differential Diagnosis:  URI Adult/Peds:  Bronchitis-viral, Bronchospasm, Sinusitis, Strep pharyngitis, Tonsilitis, Viral pharyngitis, Viral syndrome and Viral upper respiratory illness    Serious Comorbid Conditions:  Adult:  None    PLAN:    URI Adult:  Will treat with azithromycin 500mg once daily x 1 day, then 250mg once daily x 4 days. Also prescribed prednisone 20mg once daily x 5 days and albuterol 2 puffs every 4-6hrs as needed. Get plenty of rest and push fluids. Can use Tylenol and/or ibuprofen as needed for pain and/or fever control. Follow up as needed.      Followup:    If not improving or if condition worsens, follow up with your Primary Care Provider    There are no Patient Instructions on file for this visit.

## 2019-02-06 ENCOUNTER — THERAPY VISIT (OUTPATIENT)
Dept: SLEEP MEDICINE | Facility: CLINIC | Age: 39
End: 2019-02-06
Payer: COMMERCIAL

## 2019-02-06 DIAGNOSIS — E66.811 CLASS 1 OBESITY DUE TO EXCESS CALORIES WITHOUT SERIOUS COMORBIDITY WITH BODY MASS INDEX (BMI) OF 31.0 TO 31.9 IN ADULT: ICD-10-CM

## 2019-02-06 DIAGNOSIS — Z82.0 FAMILY HISTORY OF SLEEP APNEA: ICD-10-CM

## 2019-02-06 DIAGNOSIS — R06.81 APNEA: ICD-10-CM

## 2019-02-06 DIAGNOSIS — R06.83 SNORING: ICD-10-CM

## 2019-02-06 DIAGNOSIS — E66.09 CLASS 1 OBESITY DUE TO EXCESS CALORIES WITHOUT SERIOUS COMORBIDITY WITH BODY MASS INDEX (BMI) OF 31.0 TO 31.9 IN ADULT: ICD-10-CM

## 2019-02-06 DIAGNOSIS — G47.10 HYPERSOMNIA: ICD-10-CM

## 2019-02-06 PROCEDURE — 95810 POLYSOM 6/> YRS 4/> PARAM: CPT | Performed by: FAMILY MEDICINE

## 2019-02-07 ENCOUNTER — OFFICE VISIT (OUTPATIENT)
Dept: SLEEP MEDICINE | Facility: CLINIC | Age: 39
End: 2019-02-07
Payer: COMMERCIAL

## 2019-02-07 VITALS
WEIGHT: 227.78 LBS | SYSTOLIC BLOOD PRESSURE: 125 MMHG | OXYGEN SATURATION: 99 % | HEART RATE: 99 BPM | DIASTOLIC BLOOD PRESSURE: 77 MMHG | BODY MASS INDEX: 30.85 KG/M2 | HEIGHT: 72 IN

## 2019-02-07 DIAGNOSIS — G47.33 OSA (OBSTRUCTIVE SLEEP APNEA): Primary | ICD-10-CM

## 2019-02-07 PROCEDURE — 99214 OFFICE O/P EST MOD 30 MIN: CPT | Performed by: FAMILY MEDICINE

## 2019-02-07 ASSESSMENT — MIFFLIN-ST. JEOR: SCORE: 1991.95

## 2019-02-07 NOTE — PROGRESS NOTES
"  Chief Complaint   Patient presents with     Study Results       Gabe Murillo is a 39 year old male who is seen the morning following his polysomnogram for review of results. He presented with symptoms suggestive of obstructive sleep apnea.    Primary presenting concerns of daytime fatigue, hypersomnia, chronic headaches with AM headaches.    Pertinent PMHx of anxiety.    Estimated body mass index is 30.32 kg/m  as calculated from the following:    Height as of 19: 1.822 m (5' 11.75\").    Weight as of 19: 100.7 kg (222 lb).  Total score - [place]: 20 (2019  8:00 AM)  STOP-BAN+/8    Patient being seen morning after study, so some detailed information has not been generated by computer system, but I reviewed the study in its entirety.    AHI ~15, baseline SpO2 ~96%, mitzy SpO2 88.2%.  Supine REM was observed.  PLM index ~9 with infrequent associated cortical arousals.  EKG appeared NSR.    Problem List:  Patient Active Problem List    Diagnosis Date Noted     Anxiety 2015     Priority: Medium     CARDIOVASCULAR SCREENING; LDL GOAL LESS THAN 130 2014     Priority: Medium     Genital warts 2011     Priority: Medium        /77   Pulse 99   Ht 1.822 m (5' 11.73\")   Wt 103.3 kg (227 lb 12.5 oz)   SpO2 99%   BMI 31.12 kg/m      Impression/Plan:    1.)  Mild to moderate VALERIA without sleep-associated hypoxemia   - Discussed this level of VALERIA is not felt to have a significant increase in long-term cardiovascular risk factors.   - Goals for treatment to reduce hypersomnia ([place] 20), headaches.   - Reviewed CPAP, oral appliances, upper airway surgery   - Will proceed with CPAP auto-titrate 5-15 cm H2O.    He will follow up with me in about 1 month(s).     Twenty-five minutes spent with patient, all of which were spent face-to-face counseling, consulting, coordinating plan of care.      Brannon Cheema MD, MD    CC:  Taylor Damon (only for reference, does not " automatically route).

## 2019-02-07 NOTE — PATIENT INSTRUCTIONS
Your There is no height or weight on file to calculate BMI.  Weight management is a personal decision.  If you are interested in exploring weight loss strategies, the following discussion covers the approaches that may be successful. Body mass index (BMI) is one way to tell whether you are at a healthy weight, overweight, or obese. It measures your weight in relation to your height.  A BMI of 18.5 to 24.9 is in the healthy range. A person with a BMI of 25 to 29.9 is considered overweight, and someone with a BMI of 30 or greater is considered obese. More than two-thirds of American adults are considered overweight or obese.  Being overweight or obese increases the risk for further weight gain. Excess weight may lead to heart disease and diabetes.  Creating and following plans for healthy eating and physical activity may help you improve your health.  Weight control is part of healthy lifestyle and includes exercise, emotional health, and healthy eating habits. Careful eating habits lifelong are the mainstay of weight control. Though there are significant health benefits from weight loss, long-term weight loss with diet alone may be very difficult to achieve- studies show long-term success with dietary management in less than 10% of people. Attaining a healthy weight may be especially difficult to achieve in those with severe obesity. In some cases, medications, devices and surgical management might be considered.  What can you do?  If you are overweight or obese and are interested in methods for weight loss, you should discuss this with your provider.     Consider reducing daily calorie intake by 500 calories.     Keep a food journal.     Avoiding skipping meals, consider cutting portions instead.    Diet combined with exercise helps maintain muscle while optimizing fat loss. Strength training is particularly important for building and maintaining muscle mass. Exercise helps reduce stress, increase energy, and  improves fitness. Increasing exercise without diet control, however, may not burn enough calories to loose weight.       Start walking three days a week 10-20 minutes at a time    Work towards walking thirty minutes five days a week     Eventually, increase the speed of your walking for 1-2 minutes at time    In addition, we recommend that you review healthy lifestyles and methods for weight loss available through the National Institutes of Health patient information sites:  http://win.niddk.nih.gov/publications/index.htm    And look into health and wellness programs that may be available through your health insurance provider, employer, local community center, or [employer].

## 2019-02-13 LAB — SLPCOMP: NORMAL

## 2019-02-14 ENCOUNTER — DOCUMENTATION ONLY (OUTPATIENT)
Dept: SLEEP MEDICINE | Facility: CLINIC | Age: 39
End: 2019-02-14
Payer: COMMERCIAL

## 2019-02-14 DIAGNOSIS — G47.33 OSA (OBSTRUCTIVE SLEEP APNEA): Primary | ICD-10-CM

## 2019-02-14 NOTE — PROGRESS NOTES
Patient was offered choice of vendor and chose [facility].  Patient Gabe Murillo was set up at [facility]  on February 14, 2019. Patient received a Resmed AirSense 10 Auto. Pressures were set at 5-15 cm H2O.   Patient s ramp is off cm H2O for Off and EPR is 2.  Patient received a [employer] Mask name: Dreamwear  Pillow mask size Medium, heated tubing and heated humidifier.  Patient is enrolled in the STM Program and may need to meet compliance. Patient needs to make an appointment for a follow up with Dr. Deni MD.  Dorothy Simon

## 2019-02-18 ENCOUNTER — DOCUMENTATION ONLY (OUTPATIENT)
Dept: SLEEP MEDICINE | Facility: CLINIC | Age: 39
End: 2019-02-18
Payer: COMMERCIAL

## 2019-02-18 NOTE — PROGRESS NOTES
3 DAY STM VISIT    Diagnostic AHI: 6.0 PSG    Patient contacted for 3 day STM visit  Message left for patient to return call.     Device type: Auto-CPAP  PAP settings from order::  CPAP min 5 cm  H20       CPAP max 15 cm  H20     Mask type:    Per patient choice     Device settings from machine      Min CPAP 5.0            Max CPAP 15.0      Assessment: Nightly usage, most nights over under 4 hours   Action plan: Pt to have f/u 14 day [facility] visit.  Patient has a follow up visit scheduled:   yes within 31-90 days of set up.

## 2019-03-05 ENCOUNTER — DOCUMENTATION ONLY (OUTPATIENT)
Dept: SLEEP MEDICINE | Facility: CLINIC | Age: 39
End: 2019-03-05
Payer: COMMERCIAL

## 2019-03-05 NOTE — PROGRESS NOTES
14  DAY STM VISIT    Diagnostic AHI: 6.0 PSG    Message left for patient to return call     Assessment: Pt not meeting objective benchmarks. compliance       Action plan: waiting for patient to return call.       Device type: Auto-CPAP    PAP settings: CPAP min 5.0 cm  H20       CPAP max 15.0 cm  H20                              95th% pressure 9.2 cm  H20     Mask type:  Per patient choice    Objective measures: 14 day rolling measures      Compliance  21 %      Leak  27.6 lpm  last  upload      AHI 2.9   last  upload      Average number of minutes 163      Objective measure goal  Compliance   Goal >70%  Leak   Goal < 24 lpm  AHI  Goal < 5  Usage  Goal >240

## 2019-03-18 ENCOUNTER — DOCUMENTATION ONLY (OUTPATIENT)
Dept: SLEEP MEDICINE | Facility: CLINIC | Age: 39
End: 2019-03-18
Payer: COMMERCIAL

## 2019-03-18 NOTE — PROGRESS NOTES
30 DAY STM VISIT    Diagnostic AHI: 6.0 PSG    Subjective measures:   Patient getting used to CPAP. 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     Assessment: Pt not meeting objective benchmarks for compliance  Patient meeting subjective benchmarks. Spoke with patient about meeting compliance.   Action plan: 2 week STM recheck appt scheduled.  Patient has not scheduled a follow up visit.  Device type: Auto-CPAP  PAP settings: CPAP min 5.0 cm  H20     CPAP max 15.0 cm  H20    95th% pressure 8.8 cm  H20   Mask type:  Per patient choice  Objective measures: 14 day rolling measures      Compliance  64 %      Leak  28.9 lpm  last  upload      AHI 1.44   last  upload      Average number of minutes 269      Objective measure goal  Compliance   Goal >70%  Leak   Goal < 24 lpm  AHI  Goal < 5  Usage  Goal >240

## 2019-04-18 ENCOUNTER — DOCUMENTATION ONLY (OUTPATIENT)
Dept: SLEEP MEDICINE | Facility: CLINIC | Age: 39
End: 2019-04-18
Payer: COMMERCIAL

## 2019-05-02 ENCOUNTER — DOCUMENTATION ONLY (OUTPATIENT)
Dept: SLEEP MEDICINE | Facility: CLINIC | Age: 39
End: 2019-05-02
Payer: COMMERCIAL

## 2019-05-02 NOTE — PROGRESS NOTES
[facility] Re-Check. Patient has not met compliance, he works long hours. Patient still has time to meet compliance checking his insurance to see if he needs to meet compliance.

## 2019-08-14 ENCOUNTER — DOCUMENTATION ONLY (OUTPATIENT)
Dept: SLEEP MEDICINE | Facility: CLINIC | Age: 39
End: 2019-08-14
Payer: COMMERCIAL

## 2019-08-14 NOTE — PROGRESS NOTES
6 month [facility] Recheck Visit     Diagnostic AHI: 6.0  PSG    Message left for patient to return call.     Assessment: Pt not meeting objective benchmarks for compliance.  Action plan: Waiting for patient to return call.   Pt to follow up per provider request       Device type: Auto-CPAP  PAP settings: CPAP min 5.0 cm  H20     CPAP max 15.0 cm  H20    95th% pressure 9.4 cm  H20   Objective measures: 14 day rolling measures      Compliance  21 %      Leak  23.8 lpm  last  upload      AHI 0.18   last  upload      Average number of minutes 87      Objective measure goal  Compliance   Goal >70%  Leak   Goal < 24 lpm  AHI  Goal < 5  Usage  Goal >240

## 2020-01-07 ENCOUNTER — OFFICE VISIT (OUTPATIENT)
Dept: FAMILY MEDICINE | Facility: CLINIC | Age: 40
End: 2020-01-07
Payer: COMMERCIAL

## 2020-01-07 VITALS
TEMPERATURE: 98.2 F | HEART RATE: 71 BPM | HEIGHT: 70 IN | RESPIRATION RATE: 16 BRPM | WEIGHT: 223 LBS | BODY MASS INDEX: 31.92 KG/M2 | DIASTOLIC BLOOD PRESSURE: 70 MMHG | SYSTOLIC BLOOD PRESSURE: 120 MMHG | OXYGEN SATURATION: 98 %

## 2020-01-07 DIAGNOSIS — Z23 NEED FOR PROPHYLACTIC VACCINATION AND INOCULATION AGAINST INFLUENZA: ICD-10-CM

## 2020-01-07 DIAGNOSIS — B07.0 PLANTAR WARTS: Primary | ICD-10-CM

## 2020-01-07 PROCEDURE — 90686 IIV4 VACC NO PRSV 0.5 ML IM: CPT | Performed by: NURSE PRACTITIONER

## 2020-01-07 PROCEDURE — 17110 DESTRUCTION B9 LES UP TO 14: CPT | Performed by: NURSE PRACTITIONER

## 2020-01-07 PROCEDURE — 90471 IMMUNIZATION ADMIN: CPT | Performed by: NURSE PRACTITIONER

## 2020-01-07 ASSESSMENT — MIFFLIN-ST. JEOR: SCORE: 1937.77

## 2020-01-07 NOTE — PATIENT INSTRUCTIONS
Our Clinic hours are:  Mondays    7:20 am - 7 pm  Tues - Fri  7:20 am - 5 pm    Clinic Phone: [phone]    The clinic lab opens at 7:30 am Mon - Fri and appointments are required.    [facility]. [phone]  Monday  8 am - 7pm  Tues - Fri 8 am - 5:30 pm         Patient Education     Plantar Warts  Warts are common skin growths that can appear anywhere on the body. Warts on the soles of the feet are called plantar warts. These warts are not a serious health problem. They usually go away without treatment. But plantar warts can be painful when you stand or walk. If this is the case, special cushions can help relieve pressure and pain. Drugstores carry these cushions and you can buy them without a prescription. If cushions don't work and the pain interferes with walking, the wart can be removed.  General care    Your healthcare provider may remove the plantar wart:  ? With prescription medicines. These may be placed directly on the wart at each office visit. Or you may be sent home with the medicine.  ? With a blade, or by freezing (cryotherapy), burning (electrocautery), or laser treatment.    You may be instructed to treat the wart yourself at home using an over-the-counter wart-removal medicine (such as 40% salicylic acid). Apply the medicine to the wart every day as directed. Don't apply the medicine to the healthy skin around the wart. In between applications, remove the dead wart tissue using the type of file suggested by your healthcare provider. You will likely need to repeat this process for several weeks to remove the entire wart.    Warts can spread from your foot to other parts of your body and to other people. Don't scratch or pick at the wart. Wash your hands well before and after touching your warts. If your child has warts, be certain to teach him or her proper hand washing techniques as well.    While many home remedies are suggested for warts, it's best to check with  your healthcare provider before trying them.    Warts often come back, even after successful treatment. Return promptly for treatment of any new warts.  Follow-up care  Follow up with your healthcare provider, or as advised.  When to seek medical advice    Signs of infection (red streaks, pus, smelly or colored discharge, or fever) appear    You have heavy bleeding or bleeding that won t stop with light pressure    The wart doesn t go away after several weeks of self-care    New warts appear on feet, hands, or face  Date Last Reviewed: 5/1/2018 2000-2019 [employer]. [address] [place] [address]. All rights reserved. This information is not intended as a substitute for professional medical care. Always follow your healthcare professional's instructions.

## 2020-01-07 NOTE — PROGRESS NOTES
Subjective     Gabe Murillo is a 39 year old male who presents to clinic today for the following health issues:    HPI   Warts  Duration of complaint: since Summer  Left foot has about 5 warts.    Hasn't tried anything for these. Remote history of warts in the same spot as a child.  Offers no other concerns today.  Wants flu shot.      Patient Active Problem List   Diagnosis     Genital warts     CARDIOVASCULAR SCREENING; LDL GOAL LESS THAN 130     Anxiety     VALERIA (obstructive sleep apnea)     Past Surgical History:   Procedure Laterality Date     EXCISE GANGLION WRIST  3/9/2012    Procedure:EXCISE GANGLION WRIST; Excise Left Dorsal Ganglion with post interosseous neurectomy- choice Anesthesia; Surgeon:BEULAH GARCIA; Location:[facility] OR     FRACTURE TX, WRIST RT/LT  2001    Fracture TX Wrist RT     VASECTOMY Bilateral 09/28/2017    No scalpel technique     wisdom teeth         Social History     Tobacco Use     Smoking status: Never Smoker     Smokeless tobacco: Never Used   Substance Use Topics     Alcohol use: Yes     Family History   Problem Relation Age of Onset     Anxiety Disorder Mother      Colon Polyps Mother      Anxiety Disorder Father      Arthritis Maternal Grandmother      Dementia Maternal Grandmother          No current outpatient medications on file.     Allergies   Allergen Reactions     Pcn [Penicillin V]      Penicillin allergy - as a baby, has not had penicillin since then, unknown reaction. Tolerated ancef during surgery 2012     BP Readings from Last 3 Encounters:   01/07/20 120/70   02/07/19 125/77   01/13/19 138/79    Wt Readings from Last 3 Encounters:   01/07/20 101.2 kg (223 lb)   02/07/19 103.3 kg (227 lb 12.5 oz)   01/13/19 100.7 kg (222 lb)                      Reviewed and updated as needed this visit by Provider         Review of Systems   ROS COMP: Constitutional, HEENT, cardiovascular, pulmonary, gi and gu systems are negative, except as otherwise noted.      Objective    BP  "120/70 (BP Location: Right arm, Patient Position: Chair, Cuff Size: Adult Large)   Pulse 71   Temp 98.2  F (36.8  C) (Oral)   Resp 16   Ht 1.77 m (5' 9.69\")   Wt 101.2 kg (223 lb)   SpO2 98%   BMI 32.29 kg/m    Body mass index is 32.29 kg/m .  Physical Exam   GENERAL: healthy, alert and no distress  NECK: no adenopathy, no asymmetry  RESP: lungs clear  CV: regular rate and rhythm  MS: no gross musculoskeletal defects noted  SKIN: 6 plantar warts on left foot, 3 cycles of freeze/thaw with liquid nitrogen applied, he tolerated well      Diagnostic Test Results:  Labs reviewed in Epic  No results found for this or any previous visit (from the past 24 hour(s)).        Assessment & Plan     1. Plantar warts    - DESTRUCT BENIGN LESION, UP TO 14    2. Need for prophylactic vaccination and inoculation against influenza    - INFLUENZA VACCINE IM > 6 MONTHS VALENT IIV4 [67394]  - Vaccine Administration, Initial [65506]     BMI:   Estimated body mass index is 32.29 kg/m  as calculated from the following:    Height as of this encounter: 1.77 m (5' 9.69\").    Weight as of this encounter: 101.2 kg (223 lb).           See Patient Instructions  Patient Instructions       Our Clinic hours are:  Mondays    7:20 am - 7 pm  Tues -  Fri  7:20 am - 5 pm    Clinic Phone: [phone]    The clinic lab opens at 7:30 am Mon - Fri and appointments are required.    [facility] [place]  Ph. [phone]  Monday  8 am - 7pm  Tues - Fri 8 am - 5:30 pm         Patient Education     Plantar Warts  Warts are common skin growths that can appear anywhere on the body. Warts on the soles of the feet are called plantar warts. These warts are not a serious health problem. They usually go away without treatment. But plantar warts can be painful when you stand or walk. If this is the case, special cushions can help relieve pressure and pain. Drugstores carry these cushions and you can buy them without a prescription. If cushions don't " work and the pain interferes with walking, the wart can be removed.  General care    Your healthcare provider may remove the plantar wart:  ? With prescription medicines. These may be placed directly on the wart at each office visit. Or you may be sent home with the medicine.  ? With a blade, or by freezing (cryotherapy), burning (electrocautery), or laser treatment.    You may be instructed to treat the wart yourself at home using an over-the-counter wart-removal medicine (such as 40% salicylic acid). Apply the medicine to the wart every day as directed. Don't apply the medicine to the healthy skin around the wart. In between applications, remove the dead wart tissue using the type of file suggested by your healthcare provider. You will likely need to repeat this process for several weeks to remove the entire wart.    Warts can spread from your foot to other parts of your body and to other people. Don't scratch or pick at the wart. Wash your hands well before and after touching your warts. If your child has warts, be certain to teach him or her proper hand washing techniques as well.    While many home remedies are suggested for warts, it's best to check with your healthcare provider before trying them.    Warts often come back, even after successful treatment. Return promptly for treatment of any new warts.  Follow-up care  Follow up with your healthcare provider, or as advised.  When to seek medical advice    Signs of infection (red streaks, pus, smelly or colored discharge, or fever) appear    You have heavy bleeding or bleeding that won t stop with light pressure    The wart doesn t go away after several weeks of self-care    New warts appear on feet, hands, or face  Date Last Reviewed: 5/1/2018 2000-2019 [employer]. [address] [place] [address], [place] [address]. All rights reserved. This information is not intended as a substitute for professional medical care. Always follow your healthcare  professional's instructions.               Return in about 2 weeks (around 1/21/2020) for or sooner if symptoms persist or worsen.    RAQUEL Quiroz [facility]

## 2020-01-30 ENCOUNTER — OFFICE VISIT (OUTPATIENT)
Dept: FAMILY MEDICINE | Facility: CLINIC | Age: 40
End: 2020-01-30
Payer: COMMERCIAL

## 2020-01-30 VITALS
HEART RATE: 75 BPM | HEIGHT: 70 IN | BODY MASS INDEX: 31.09 KG/M2 | SYSTOLIC BLOOD PRESSURE: 132 MMHG | TEMPERATURE: 97.4 F | WEIGHT: 217.2 LBS | DIASTOLIC BLOOD PRESSURE: 86 MMHG | OXYGEN SATURATION: 99 % | RESPIRATION RATE: 16 BRPM

## 2020-01-30 DIAGNOSIS — B07.0 PLANTAR WARTS: Primary | ICD-10-CM

## 2020-01-30 PROCEDURE — 17110 DESTRUCTION B9 LES UP TO 14: CPT | Performed by: NURSE PRACTITIONER

## 2020-01-30 ASSESSMENT — MIFFLIN-ST. JEOR: SCORE: 1906.7

## 2020-01-30 NOTE — PROGRESS NOTES
"Subjective     Gabe Murillo is a 40 year old male who presents to clinic today for the following health issues:    HPI   WART(S)  Onset: summer     Description:   Location: left   Number of warts: 5  Painful: no     Accompanying Signs & Symptoms:  Signs of infection: no     History:   History of trauma: no   Prior warts: YES- as a child     Therapies Tried and outcome: liquid nitrogen and OTC meds          Patient Active Problem List   Diagnosis     Genital warts     CARDIOVASCULAR SCREENING; LDL GOAL LESS THAN 130     Anxiety     VALERIA (obstructive sleep apnea)     Past Surgical History:   Procedure Laterality Date     EXCISE GANGLION WRIST  3/9/2012    Procedure:EXCISE GANGLION WRIST; Excise Left Dorsal Ganglion with post interosseous neurectomy- choice Anesthesia; Surgeon:BEULAH GARCIA; Location:[facility]     FRACTURE TX, WRIST RT/LT  2001    Fracture TX Wrist RT     VASECTOMY Bilateral 09/28/2017    No scalpel technique     wisdom teeth         Social History     Tobacco Use     Smoking status: Never Smoker     Smokeless tobacco: Never Used   Substance Use Topics     Alcohol use: Yes     Family History   Problem Relation Age of Onset     Anxiety Disorder Mother      Colon Polyps Mother      Anxiety Disorder Father      Arthritis Maternal Grandmother      Dementia Maternal Grandmother              Reviewed and updated as needed this visit by Provider         Review of Systems   ROS COMP: Constitutional, HEENT, cardiovascular, pulmonary, gi and gu systems are negative, except as otherwise noted.      Objective    /86 (BP Location: Right arm, Patient Position: Sitting, Cuff Size: Adult Regular)   Pulse 75   Temp 97.4  F (36.3  C) (Tympanic)   Resp 16   Ht 1.77 m (5' 9.7\")   Wt 98.5 kg (217 lb 3.2 oz)   SpO2 99%   BMI 31.43 kg/m    Body mass index is 31.43 kg/m .  Physical Exam   GENERAL: healthy, alert and no distress  SKIN: plantars warts to left foot plantar surface- #7, no surrounding " "erythema  NEURO: Normal strength and tone, mentation intact and speech normal    Discussed etiology and natural course of warts as well as risk and benefit of treatment of cutaneous warts and patient desires treatment.  Treated with liquid nitrogen with 4 freeze cycles.  Paring was done prior to liquid nitrogen.  Patient tolerated procedure well.        Diagnostic Test Results:  none         Assessment & Plan       ICD-10-CM    1. Plantar warts B07.0 DESTRUCT BENIGN LESION, UP TO 14        BMI:   Estimated body mass index is 31.43 kg/m  as calculated from the following:    Height as of this encounter: 1.77 m (5' 9.7\").    Weight as of this encounter: 98.5 kg (217 lb 3.2 oz).         FUTURE APPOINTMENTS:       - Follow-up visit in 1 month for persistent warts.    Patient Instructions   Recommend treating with salicylic acid preparation daily after blistering resolved.  Continue salicylic acid treatment for 2-4 weeks with filing of skin between treatments.  Return if wart not resolved.          Patient Education     Understanding Plantar Warts    A plantar wart is a small, noncancerous growth on the bottom of the foot. Plantar warts often develop where friction or pressure occurs, such as on the ball of the foot. The word plantar refers to the sole of the foot. Similar warts can occur on other areas of the body such as the hands. Plantar warts are more common in children and young adults.  What causes a plantar wart?  Plantar warts are caused by a virus called human papillomavirus (HPV).  They can be spread by person-to-person contact. Or you can develop one if you walk barefoot on moist surfaces infected with the virus. This might be in a community pool area or locker room. Wearing correct footwear in such places can prevent them.  What are the symptoms of plantar warts?  Plantar warts cause a thick, rough, and often raised patch of skin on the bottom of the foot. The wart may have black dots on it. These dots are " dried blood. The wart may cause pain or discomfort. You may also have trouble walking because of the pain.  How are plantar warts treated?  Many plantar warts go away without any treatment. But for those that are painful or that don t go away, several treatments are available. These include:    Salicylic acid. This treatment is put directly on the wart. It may come in the form of a liquid, ointment, pad, or patch. It is available over the counter. Don't use salicylic acid treatment for more than 12 weeks without talking with your healthcare provider.    Cryotherapy. Your healthcare provider puts liquid nitrogen on the wart with a cotton swab or spray. This treatment might be painful.    Medicine. A variety of medicines can be put on or injected into the wart. But research is mixed on how well they work.  There are many folk remedies for plantar warts, but some of these are unproven and can be dangerous. Talk with your healthcare provider about safe methods to try. Often your healthcare provider will cut away dead parts of the wart before using other treatments.    When should I call my healthcare provider?  Call your healthcare provider if you have plantar warts that become too painful and don't go away on their own or with over-the-counter and at-home treatments.  Date Last Reviewed: 3/30/2016    6624-4040 [employer]. [address] [place] [address], [place] [address]. All rights reserved. This information is not intended as a substitute for professional medical care. Always follow your healthcare professional's instructions.               No follow-ups on file.    RAQUEL Smith [facility]

## 2020-01-30 NOTE — PATIENT INSTRUCTIONS
Recommend treating with salicylic acid preparation daily after blistering resolved.  Continue salicylic acid treatment for 2-4 weeks with filing of skin between treatments.  Return if wart not resolved.          Patient Education     Understanding Plantar Warts    A plantar wart is a small, noncancerous growth on the bottom of the foot. Plantar warts often develop where friction or pressure occurs, such as on the ball of the foot. The word plantar refers to the sole of the foot. Similar warts can occur on other areas of the body such as the hands. Plantar warts are more common in children and young adults.  What causes a plantar wart?  Plantar warts are caused by a virus called human papillomavirus (HPV).  They can be spread by person-to-person contact. Or you can develop one if you walk barefoot on moist surfaces infected with the virus. This might be in a community pool area or locker room. Wearing correct footwear in such places can prevent them.  What are the symptoms of plantar warts?  Plantar warts cause a thick, rough, and often raised patch of skin on the bottom of the foot. The wart may have black dots on it. These dots are dried blood. The wart may cause pain or discomfort. You may also have trouble walking because of the pain.  How are plantar warts treated?  Many plantar warts go away without any treatment. But for those that are painful or that don t go away, several treatments are available. These include:    Salicylic acid. This treatment is put directly on the wart. It may come in the form of a liquid, ointment, pad, or patch. It is available over the counter. Don't use salicylic acid treatment for more than 12 weeks without talking with your healthcare provider.    Cryotherapy. Your healthcare provider puts liquid nitrogen on the wart with a cotton swab or spray. This treatment might be painful.    Medicine. A variety of medicines can be put on or injected into the wart. But research is mixed on how  well they work.  There are many folk remedies for plantar warts, but some of these are unproven and can be dangerous. Talk with your healthcare provider about safe methods to try. Often your healthcare provider will cut away dead parts of the wart before using other treatments.    When should I call my healthcare provider?  Call your healthcare provider if you have plantar warts that become too painful and don't go away on their own or with over-the-counter and at-home treatments.  Date Last Reviewed: 3/30/2016    0233-5663 [employer]. [address] [place] [address]. All rights reserved. This information is not intended as a substitute for professional medical care. Always follow your healthcare professional's instructions.

## 2020-03-01 ENCOUNTER — HEALTH MAINTENANCE LETTER (OUTPATIENT)
Age: 40
End: 2020-03-01

## 2020-07-21 ENCOUNTER — E-VISIT (OUTPATIENT)
Dept: FAMILY MEDICINE | Facility: CLINIC | Age: 40
End: 2020-07-21
Payer: COMMERCIAL

## 2020-07-21 DIAGNOSIS — B07.9 VIRAL WARTS, UNSPECIFIED TYPE: Primary | ICD-10-CM

## 2020-07-21 PROCEDURE — 99207 ZZC NON-BILLABLE SERV PER CHARTING: CPT | Performed by: FAMILY MEDICINE

## 2020-07-28 ENCOUNTER — VIRTUAL VISIT (OUTPATIENT)
Dept: FAMILY MEDICINE | Facility: CLINIC | Age: 40
End: 2020-07-28
Payer: COMMERCIAL

## 2020-07-28 VITALS — TEMPERATURE: 97 F

## 2020-07-28 DIAGNOSIS — F33.1 MODERATE EPISODE OF RECURRENT MAJOR DEPRESSIVE DISORDER (H): ICD-10-CM

## 2020-07-28 DIAGNOSIS — F41.9 ANXIETY: Primary | ICD-10-CM

## 2020-07-28 PROCEDURE — 96127 BRIEF EMOTIONAL/BEHAV ASSMT: CPT | Performed by: NURSE PRACTITIONER

## 2020-07-28 PROCEDURE — 99214 OFFICE O/P EST MOD 30 MIN: CPT | Mod: TEL | Performed by: NURSE PRACTITIONER

## 2020-07-28 RX ORDER — BUSPIRONE HYDROCHLORIDE 10 MG/1
10 TABLET ORAL 3 TIMES DAILY PRN
Qty: 90 TABLET | Refills: 3 | Status: SHIPPED | OUTPATIENT
Start: 2020-07-28 | End: 2020-08-11

## 2020-07-28 RX ORDER — BUPROPION HYDROCHLORIDE 150 MG/1
150 TABLET ORAL EVERY MORNING
Qty: 30 TABLET | Refills: 1 | Status: SHIPPED | OUTPATIENT
Start: 2020-07-28 | End: 2020-08-11

## 2020-07-28 ASSESSMENT — ANXIETY QUESTIONNAIRES
7. FEELING AFRAID AS IF SOMETHING AWFUL MIGHT HAPPEN: NOT AT ALL
2. NOT BEING ABLE TO STOP OR CONTROL WORRYING: NEARLY EVERY DAY
6. BECOMING EASILY ANNOYED OR IRRITABLE: NEARLY EVERY DAY
IF YOU CHECKED OFF ANY PROBLEMS ON THIS QUESTIONNAIRE, HOW DIFFICULT HAVE THESE PROBLEMS MADE IT FOR YOU TO DO YOUR WORK, TAKE CARE OF THINGS AT HOME, OR GET ALONG WITH OTHER PEOPLE: VERY DIFFICULT
1. FEELING NERVOUS, ANXIOUS, OR ON EDGE: NEARLY EVERY DAY
3. WORRYING TOO MUCH ABOUT DIFFERENT THINGS: NEARLY EVERY DAY
5. BEING SO RESTLESS THAT IT IS HARD TO SIT STILL: NOT AT ALL
GAD7 TOTAL SCORE: 12

## 2020-07-28 ASSESSMENT — PAIN SCALES - GENERAL: PAINLEVEL: NO PAIN (0)

## 2020-07-28 ASSESSMENT — PATIENT HEALTH QUESTIONNAIRE - PHQ9
5. POOR APPETITE OR OVEREATING: NOT AT ALL
SUM OF ALL RESPONSES TO PHQ QUESTIONS 1-9: 19

## 2020-07-28 NOTE — PROGRESS NOTES
This writer attempted to contact pt on 07/28/20      Reason for call schedule virtual appt and left message.      If patient calls back:   Schedule virtual appointment within 2 weeks with PCP, document that pt called and close encounter         Jes Salinas MA

## 2020-07-28 NOTE — LETTER
My Depression Action Plan  Name: Gabe Murillo   Date of Birth 1980  Date: 7/28/2020    My doctor: Taylor Damon   My clinic: [address] [id number] [phone]          GREEN    ZONE   Good Control    What it looks like:     Things are going generally well. You have normal ups and downs. You may even feel depressed from time to time, but bad moods usually last less than a day.   What you need to do:  1. Continue to care for yourself (see self care plan)  2. Check your depression survival kit and update it as needed  3. Follow your physician s recommendations including any medication.  4. Do not stop taking medication unless you consult with your physician first.           YELLOW         ZONE Getting Worse    What it looks like:     Depression is starting to interfere with your life.     It may be hard to get out of bed; you may be starting to isolate yourself from others.    Symptoms of depression are starting to last most all day and this has happened for several days.     You may have suicidal thoughts but they are not constant.   What you need to do:     1. Call your care team. Your response to treatment will improve if you keep your care team informed of your progress. Yellow periods are signs an adjustment may need to be made.     2. Continue your self-care.  Just get dressed and ready for the day.  Don't give yourself time to talk yourself out of it.    3. Talk to someone in your support network.    4. Open up your Depression Self-Care Plan/Wellness Kit.           RED    ZONE Medical Alert - Get Help    What it looks like:     Depression is seriously interfering with your life.     You may experience these or other symptoms: You can t get out of bed most days, can t work or engage in other necessary activities, you have trouble taking care of basic hygiene, or basic responsibilities, thoughts of suicide or death that will not  go away, self-injurious behavior.     What you need to do:  1. Call your care team and request a same-day appointment. If they are not available (weekends or after hours) call your local crisis line, emergency room or 911.            Depression Self-Care Plan / Wellness Kit    Self-Care for Depression  Here s the deal. Your body and mind are really not as separate as most people think.  What you do and think affects how you feel and how you feel influences what you do and think. This means if you do things that people who feel good do, it will help you feel better.  Sometimes this is all it takes.  There is also a place for medication and therapy depending on how severe your depression is, so be sure to consult with your medical provider and/ or Behavioral Health Consultant if your symptoms are worsening or not improving.     In order to better manage my stress, I will:    Exercise  Get some form of exercise, every day. This will help reduce pain and release endorphins, the  feel good  chemicals in your brain. This is almost as good as taking antidepressants!  This is not the same as joining a gym and then never going! (they count on that by the way ) It can be as simple as just going for a walk or doing some gardening, anything that will get you moving.      Hygiene   Maintain good hygiene (get out of bed in the morning, make your bed, brush your teeth, take a shower, and get dressed like you were going to work, even if you are unemployed).  If your clothes don't fit try to get ones that do.    Diet  Strive to eat foods that are good for me, drink plenty of water, and avoid excessive sugar, caffeine, alcohol, and other mood-altering substances.  Some foods that are helpful in depression are: complex carbohydrates, B vitamins, flaxseed, fish or fish oil, fresh fruits and vegetables.    Psychotherapy  Agree to participate in Individual Therapy (if recommended).    Medication  If prescribed medications, I agree to  take them.  Missing doses can result in serious side effects.  I understand that drinking alcohol, or other illicit drug use, may cause potential side effects.  I will not stop my medication abruptly without first discussing it with my provider.    Staying Connected With Others  Stay in touch with my friends, family members, and my primary care provider/team.    Use your imagination  Be creative.  We all have a creative side; it doesn t matter if it s oil painting, sand castles, or mud pies! This will also kick up the endorphins.    Witness Beauty  (AKA stop and smell the roses) Take a look outside, even in mid-winter. Notice colors, textures. Watch the squirrels and birds.     Service to others  Be of service to others.  There is always someone else in need.  By helping others we can  get out of ourselves  and remember the really important things.  This also provides opportunities for practicing all the other parts of the program.    Humor  Laugh and be silly!  Adjust your TV habits for less news and crime-drama and more comedy.    Control your stress  Try breathing deep, massage therapy, biofeedback, and meditation. Find time to relax each day.     Crisis Text Line  http://www.crisistextline.org    The Crisis Text Line serves anyone, in any type of crisis, providing access to free, 24/7 support and information via the medium people already use and trust:    Here's how it works:  1.  Text 792-470 from anywhere in the [place], anytime, about any type of crisis.  2.  A live, trained Crisis Counselor receives the text and responds quickly.  3.  The volunteer Crisis Counselor will help you move from a 'hot moment to a cool moment'.    My support system    Clinic Contact:  Phone number:    Contact 1:  Phone number:    Contact 2:  Phone number:    Protestant/:  Phone number:    Therapist:  Phone number:    Local crisis center:    Phone number:    Other community support:  Phone number:

## 2020-07-28 NOTE — PATIENT INSTRUCTIONS
Start Wellbutrin/bupropion daily in the morning.   Take Buspar/buspirone three times a day as needed for anxiety- not addictive or habit forming.  Can cause mild dizziness and drowsiness.      I entered a referral to therapy.  They will reach out to you to schedule.      Depression Self-Care Plan / Wellness Kit    Self-Care for Depression  Here s the deal. Your body and mind are really not as separate as most people think.  What you do and think affects how you feel and how you feel influences what you do and think. This means if you do things that people who feel good do, it will help you feel better.  Sometimes this is all it takes.  There is also a place for medication and therapy depending on how severe your depression is, so be sure to consult with your medical provider and/ or Behavioral Health Consultant if your symptoms are worsening or not improving.     In order to better manage my stress, I will:    Exercise  Get some form of exercise, every day. This will help reduce pain and release endorphins, the  feel good  chemicals in your brain. This is almost as good as taking antidepressants!  This is not the same as joining a gym and then never going! (they count on that by the way ) It can be as simple as just going for a walk or doing some gardening, anything that will get you moving.      Hygiene   Maintain good hygiene (get out of bed in the morning, make your bed, brush your teeth, take a shower, and get dressed like you were going to work, even if you are unemployed).  If your clothes don't fit try to get ones that do.    Diet  Strive to eat foods that are good for me, drink plenty of water, and avoid excessive sugar, caffeine, alcohol, and other mood-altering substances.  Some foods that are helpful in depression are: complex carbohydrates, B vitamins, flaxseed, fish or fish oil, fresh fruits and vegetables.    Psychotherapy  Agree to participate in Individual Therapy (if recommended).    Medication  If  prescribed medications, I agree to take them.  Missing doses can result in serious side effects.  I understand that drinking alcohol, or other illicit drug use, may cause potential side effects.  I will not stop my medication abruptly without first discussing it with my provider.    Staying Connected With Others  Stay in touch with my friends, family members, and my primary care provider/team.    Use your imagination  Be creative.  We all have a creative side; it doesn t matter if it s oil painting, sand castles, or mud pies! This will also kick up the endorphins.    Witness Beauty  (AKA stop and smell the roses) Take a look outside, even in mid-winter. Notice colors, textures. Watch the squirrels and birds.     Service to others  Be of service to others.  There is always someone else in need.  By helping others we can  get out of ourselves  and remember the really important things.  This also provides opportunities for practicing all the other parts of the program.    Humor  Laugh and be silly!  Adjust your TV habits for less news and crime-drama and more comedy.    Control your stress  Try breathing deep, massage therapy, biofeedback, and meditation. Find time to relax each day.     Crisis Text Line  http://www.crisistextline.org    The Crisis Text Line serves anyone, in any type of crisis, providing access to free, 24/7 support and information via the medium people already use and trust:    Here's how it works:  1.  Text 809-785 from anywhere in the [place], anytime, about any type of crisis.  2.  A live, trained Crisis Counselor receives the text and responds quickly.  3.  The volunteer Crisis Counselor will help you move from a 'hot moment to a cool moment'.

## 2020-07-28 NOTE — Clinical Note
Please call patient to make a follow up appointment for two weeks from now for depression/anxiety  Thanks!  Rochelle

## 2020-07-28 NOTE — PROGRESS NOTES
"Gabe Murillo is a 40 year old male who is being evaluated via a billable telephone visit.      The patient has been notified of following:     \"This telephone visit will be conducted via a call between you and your physician/provider. We have found that certain health care needs can be provided without the need for a physical exam.  This service lets us provide the care you need with a short phone conversation.  If a prescription is necessary we can send it directly to your pharmacy.  If lab work is needed we can place an order for that and you can then stop by our lab to have the test done at a later time.    Telephone visits are billed at different rates depending on your insurance coverage. During this emergency period, for some insurers they may be billed the same as an in-person visit.  Please reach out to your insurance provider with any questions.    If during the course of the call the physician/provider feels a telephone visit is not appropriate, you will not be charged for this service.\"    Patient has given verbal consent for Telephone visit?  Yes    What phone number would you like to be contacted at? [phone]    How would you like to obtain your AVS? Jorge Urrutia     Gabe Murillo is a 40 year old male who presents via phone visit today for the following health issues:    HPI    Depression and Anxiety Follow-Up    How are you doing with your depression since your last visit? Worsened     How are you doing with your anxiety since your last visit?  Worsened     Are you having other symptoms that might be associated with depression or anxiety? No    Have you had a significant life event? No     Do you have any concerns with your use of alcohol or other drugs? No  States has gone through this before.  States Lexapro and Celexa did not help.  He was on on Celexa for 6-8 months.  Gained weight on it.    States home life is not going well.  They are starting couples counseling.  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  This is stressful especially in setting of COVID-19..  Denies SI/HI.  Social History     Tobacco Use     Smoking status: Never Smoker     Smokeless tobacco: Never Used   Substance Use Topics     Alcohol use: Yes     Drug use: No     PHQ 1/26/2016 8/7/2018 7/28/2020   PHQ-9 Total Score 11 15 19   Q9: Thoughts of better off dead/self-harm past 2 weeks Not at all Not at all Not at all     MILEY-7 SCORE 1/26/2016 8/7/2018 7/28/2020   Total Score 12 14 12     Last PHQ-9 7/28/2020   1.  Little interest or pleasure in doing things 2   2.  Feeling down, depressed, or hopeless 3   3.  Trouble falling or staying asleep, or sleeping too much 3   4.  Feeling tired or having little energy 3   5.  Poor appetite or overeating 3   6.  Feeling bad about yourself 1   7.  Trouble concentrating 3   8.  Moving slowly or restless 1   Q9: Thoughts of better off dead/self-harm past 2 weeks 0   PHQ-9 Total Score 19   Difficulty at work, home, or with people Very difficult     MILEY-7  7/28/2020   1. Feeling nervous, anxious, or on edge 3   2. Not being able to stop or control worrying 3   3. Worrying too much about different things 3   4. Trouble relaxing 0   5. Being so restless that it is hard to sit still 0   6. Becoming easily annoyed or irritable 3   7. Feeling afraid, as if something awful might happen 0   MILEY-7 Total Score 12   If you checked any problems, how difficult have they made it for you to do your work, take care of things at home, or get along with other people? Very difficult       Suicide Assessment Five-step Evaluation and Treatment (SAFE-T)      How many servings of fruits and vegetables do you eat daily?  0-1    On average, how many sweetened beverages do you drink each day (Examples: soda, juice, sweet tea, etc.  Do NOT count diet or artificially sweetened beverages)?   0    How many days per week do you exercise enough to make your heart beat faster?  none    How many minutes a day do you exercise enough to make your heart beat faster? none    How many days per week do you miss taking your medication? 0        Patient Active Problem List   Diagnosis     Genital warts     CARDIOVASCULAR SCREENING; LDL GOAL LESS THAN 130     Anxiety     VALERIA (obstructive sleep apnea)     Past Surgical History:   Procedure Laterality Date     EXCISE GANGLION WRIST  3/9/2012    Procedure:EXCISE GANGLION WRIST; Excise Left Dorsal Ganglion with post interosseous neurectomy- choice Anesthesia; Surgeon:BEULAH GARCIA; Location:[facility]     FRACTURE TX, WRIST RT/LT  2001    Fracture TX Wrist RT     VASECTOMY Bilateral 09/28/2017    No scalpel technique     wisdom teeth         Social History     Tobacco Use     Smoking status: Never Smoker     Smokeless tobacco: Never Used   Substance Use Topics     Alcohol use: Yes     Family History   Problem Relation Age of Onset     Anxiety Disorder Mother      Colon Polyps Mother      Anxiety Disorder Father      Arthritis Maternal Grandmother      Dementia Maternal Grandmother          No current outpatient medications on file.     Allergies   Allergen Reactions     Pcn [Penicillin V]      Penicillin allergy - as a baby, has not had penicillin since then, unknown reaction. Tolerated ancef during surgery 2012     BP Readings from Last 3 Encounters:   01/30/20 132/86   01/07/20 120/70   02/07/19 125/77    Wt Readings from Last 3 Encounters:   01/30/20 98.5 kg (217 lb 3.2 oz)   01/07/20 101.2 kg (223 lb)   02/07/19 103.3 kg (227 lb 12.5 oz)                    Reviewed and updated as needed this visit by Provider         Review of Systems   Constitutional, HEENT, cardiovascular, pulmonary, GI, , musculoskeletal, neuro, skin, endocrine and psych systems are negative, except as otherwise noted.       Objective   Reported vitals:  Temp 97  F (36.1  C)    healthy, alert and no distress  PSYCH: Alert and oriented times 3; coherent speech, normal   rate and  volume, able to articulate logical thoughts, able   to abstract reason, no tangential thoughts, no hallucinations   or delusions  His affect is pleasant and anxious  RESP: No cough, no audible wheezing, able to talk in full sentences  Remainder of exam unable to be completed due to telephone visits    Diagnostic Test Results:  Labs reviewed in Epic        Assessment/Plan:    1. Anxiety  Starting medication.  Will trial Wellbutrin since serotonin specific reuptake inhibitors in past did nto seem to help.  Buspar for as needed use for anxiety.  Will also start individual counseling.    - busPIRone (BUSPAR) 10 MG tablet; Take 1 tablet (10 mg) by mouth 3 times daily as needed (anxiety)  Dispense: 90 tablet; Refill: 3  - buPROPion (WELLBUTRIN XL) 150 MG 24 hr tablet; Take 1 tablet (150 mg) by mouth every morning  Dispense: 30 tablet; Refill: 1  - MENTAL HEALTH REFERRAL  - Adult; Outpatient Treatment; Individual/Couples/Family/Group Therapy/Health Psychology[place] [facility] [place] [facility] [phone]; We will contact you to schedule the appointment or please call with any questions    2. Moderate episode of recurrent major depressive disorder (H)  As above.   - buPROPion (WELLBUTRIN XL) 150 MG 24 hr tablet; Take 1 tablet (150 mg) by mouth every morning  Dispense: 30 tablet; Refill: 1  - MENTAL HEALTH REFERRAL  - Adult; Outpatient Treatment; Individual/Couples/Family/Group Therapy/Health Psychology[place] [facility] [place] [facility] [phone]; We will contact you to schedule the appointment or please call with any questions    Return in about 2 weeks (around 8/11/2020).      Phone call duration:  15 minutes    RAQUEL Harper CNP      Patient Instructions   Start Wellbutrin/bupropion daily in the morning.   Take Buspar/buspirone three times a day as needed for anxiety- not addictive or habit forming.  Can cause mild dizziness and drowsiness.      I entered a referral to therapy.  They will reach out to  you to schedule.      Depression Self-Care Plan / Wellness Kit    Self-Care for Depression  Here s the deal. Your body and mind are really not as separate as most people think.  What you do and think affects how you feel and how you feel influences what you do and think. This means if you do things that people who feel good do, it will help you feel better.  Sometimes this is all it takes.  There is also a place for medication and therapy depending on how severe your depression is, so be sure to consult with your medical provider and/ or Behavioral Health Consultant if your symptoms are worsening or not improving.     In order to better manage my stress, I will:    Exercise  Get some form of exercise, every day. This will help reduce pain and release endorphins, the  feel good  chemicals in your brain. This is almost as good as taking antidepressants!  This is not the same as joining a gym and then never going! (they count on that by the way ) It can be as simple as just going for a walk or doing some gardening, anything that will get you moving.      Hygiene   Maintain good hygiene (get out of bed in the morning, make your bed, brush your teeth, take a shower, and get dressed like you were going to work, even if you are unemployed).  If your clothes don't fit try to get ones that do.    Diet  Strive to eat foods that are good for me, drink plenty of water, and avoid excessive sugar, caffeine, alcohol, and other mood-altering substances.  Some foods that are helpful in depression are: complex carbohydrates, B vitamins, flaxseed, fish or fish oil, fresh fruits and vegetables.    Psychotherapy  Agree to participate in Individual Therapy (if recommended).    Medication  If prescribed medications, I agree to take them.  Missing doses can result in serious side effects.  I understand that drinking alcohol, or other illicit drug use, may cause potential side effects.  I will not stop my medication abruptly without first  discussing it with my provider.    Staying Connected With Others  Stay in touch with my friends, family members, and my primary care provider/team.    Use your imagination  Be creative.  We all have a creative side; it doesn t matter if it s oil painting, sand castles, or mud pies! This will also kick up the endorphins.    Witness Beauty  (AKA stop and smell the roses) Take a look outside, even in mid-winter. Notice colors, textures. Watch the squirrels and birds.     Service to others  Be of service to others.  There is always someone else in need.  By helping others we can  get out of ourselves  and remember the really important things.  This also provides opportunities for practicing all the other parts of the program.    Humor  Laugh and be silly!  Adjust your TV habits for less news and crime-drama and more comedy.    Control your stress  Try breathing deep, massage therapy, biofeedback, and meditation. Find time to relax each day.     Crisis Text Line  http://www.crisistextline.org    The Crisis Text Line serves anyone, in any type of crisis, providing access to free, 24/7 support and information via the medium people already use and trust:    Here's how it works:  1.  Text 081-754 from anywhere in the [place], anytime, about any type of crisis.  2.  A live, trained Crisis Counselor receives the text and responds quickly.  3.  The volunteer Crisis Counselor will help you move from a 'hot moment to a cool moment'.

## 2020-07-29 ASSESSMENT — ANXIETY QUESTIONNAIRES: GAD7 TOTAL SCORE: 12

## 2020-08-11 ENCOUNTER — VIRTUAL VISIT (OUTPATIENT)
Dept: FAMILY MEDICINE | Facility: CLINIC | Age: 40
End: 2020-08-11
Payer: COMMERCIAL

## 2020-08-11 DIAGNOSIS — F41.9 ANXIETY: ICD-10-CM

## 2020-08-11 DIAGNOSIS — F33.1 MODERATE EPISODE OF RECURRENT MAJOR DEPRESSIVE DISORDER (H): ICD-10-CM

## 2020-08-11 PROCEDURE — 99214 OFFICE O/P EST MOD 30 MIN: CPT | Mod: TEL | Performed by: NURSE PRACTITIONER

## 2020-08-11 PROCEDURE — 96127 BRIEF EMOTIONAL/BEHAV ASSMT: CPT | Mod: TEL | Performed by: NURSE PRACTITIONER

## 2020-08-11 RX ORDER — BUPROPION HYDROCHLORIDE 150 MG/1
300 TABLET ORAL EVERY MORNING
Qty: 60 TABLET | Refills: 4 | Status: SHIPPED | OUTPATIENT
Start: 2020-08-11 | End: 2020-11-06

## 2020-08-11 RX ORDER — BUSPIRONE HYDROCHLORIDE 15 MG/1
15 TABLET ORAL 3 TIMES DAILY
Qty: 90 TABLET | Refills: 4 | Status: SHIPPED | OUTPATIENT
Start: 2020-08-11 | End: 2020-11-06

## 2020-08-11 ASSESSMENT — ANXIETY QUESTIONNAIRES
7. FEELING AFRAID AS IF SOMETHING AWFUL MIGHT HAPPEN: SEVERAL DAYS
2. NOT BEING ABLE TO STOP OR CONTROL WORRYING: MORE THAN HALF THE DAYS
1. FEELING NERVOUS, ANXIOUS, OR ON EDGE: MORE THAN HALF THE DAYS
GAD7 TOTAL SCORE: 11
5. BEING SO RESTLESS THAT IT IS HARD TO SIT STILL: NOT AT ALL
6. BECOMING EASILY ANNOYED OR IRRITABLE: NEARLY EVERY DAY
IF YOU CHECKED OFF ANY PROBLEMS ON THIS QUESTIONNAIRE, HOW DIFFICULT HAVE THESE PROBLEMS MADE IT FOR YOU TO DO YOUR WORK, TAKE CARE OF THINGS AT HOME, OR GET ALONG WITH OTHER PEOPLE: NOT DIFFICULT AT ALL
3. WORRYING TOO MUCH ABOUT DIFFERENT THINGS: NEARLY EVERY DAY

## 2020-08-11 ASSESSMENT — PATIENT HEALTH QUESTIONNAIRE - PHQ9
5. POOR APPETITE OR OVEREATING: NOT AT ALL
SUM OF ALL RESPONSES TO PHQ QUESTIONS 1-9: 5

## 2020-08-11 NOTE — PROGRESS NOTES
"Gabe Murillo is a 40 year old male who is being evaluated via a billable telephone visit.      The patient has been notified of following:     \"This telephone visit will be conducted via a call between you and your physician/provider. We have found that certain health care needs can be provided without the need for a physical exam.  This service lets us provide the care you need with a short phone conversation.  If a prescription is necessary we can send it directly to your pharmacy.  If lab work is needed we can place an order for that and you can then stop by our lab to have the test done at a later time.    Telephone visits are billed at different rates depending on your insurance coverage. During this emergency period, for some insurers they may be billed the same as an in-person visit.  Please reach out to your insurance provider with any questions.    If during the course of the call the physician/provider feels a telephone visit is not appropriate, you will not be charged for this service.\"    Patient has given verbal consent for Telephone visit?  Yes    What phone number would you like to be contacted at? [phone]     How would you like to obtain your AVS? Jorge Urrutia     Gabe Murillo is a 40 year old male who presents via phone visit today for the following health issues:    HPI    Depression and Anxiety Follow-Up    How are you doing with your depression since your last visit? No change    How are you doing with your anxiety since your last visit?  No change    Are you having other symptoms that might be associated with depression or anxiety? Yes:  short tempered     Have you had a significant life event? No     Do you have any concerns with your use of alcohol or other drugs? No  States to him seems the same.  Though PHQ9 is improved.  Relations with wife still not great.  They are starting counseling in the next week or so.  For individual counseling, he was unsure if he wanted to start " it.  Wanted to see how medications worked first.   Anxiety worse in morning and at night.  Has been taking Buspar twice a day   Social History     Tobacco Use     Smoking status: Never Smoker     Smokeless tobacco: Never Used   Substance Use Topics     Alcohol use: Yes     Drug use: No     PHQ 8/7/2018 7/28/2020 8/11/2020   PHQ-9 Total Score 15 19 5   Q9: Thoughts of better off dead/self-harm past 2 weeks Not at all Not at all Not at all     MILEY-7 SCORE 8/7/2018 7/28/2020 8/11/2020   Total Score 14 12 11     Last PHQ-9 8/11/2020   1.  Little interest or pleasure in doing things 2   2.  Feeling down, depressed, or hopeless 1   3.  Trouble falling or staying asleep, or sleeping too much 1   4.  Feeling tired or having little energy 0   5.  Poor appetite or overeating 0   6.  Feeling bad about yourself 1   7.  Trouble concentrating 0   8.  Moving slowly or restless 0   Q9: Thoughts of better off dead/self-harm past 2 weeks 0   PHQ-9 Total Score 5   Difficulty at work, home, or with people Not difficult at all     MILEY-7  8/11/2020   1. Feeling nervous, anxious, or on edge 2   2. Not being able to stop or control worrying 2   3. Worrying too much about different things 3   4. Trouble relaxing 0   5. Being so restless that it is hard to sit still 0   6. Becoming easily annoyed or irritable 3   7. Feeling afraid, as if something awful might happen 1   MILEY-7 Total Score 11   If you checked any problems, how difficult have they made it for you to do your work, take care of things at home, or get along with other people? Not difficult at all       Suicide Assessment Five-step Evaluation and Treatment (SAFE-T)      How many servings of fruits and vegetables do you eat daily?  0-1    On average, how many sweetened beverages do you drink each day (Examples: soda, juice, sweet tea, etc.  Do NOT count diet or artificially sweetened beverages)?   0    How many days per week do you exercise enough to make your heart beat faster? 3  or less    How many minutes a day do you exercise enough to make your heart beat faster? 9 or less    How many days per week do you miss taking your medication? 0             Patient Active Problem List   Diagnosis     Genital warts     CARDIOVASCULAR SCREENING; LDL GOAL LESS THAN 130     Anxiety     VALERIA (obstructive sleep apnea)     Past Surgical History:   Procedure Laterality Date     EXCISE GANGLION WRIST  3/9/2012    Procedure:EXCISE GANGLION WRIST; Excise Left Dorsal Ganglion with post interosseous neurectomy- choice Anesthesia; Surgeon:BEULAH GARCIA; Location:[facility]     FRACTURE TX, WRIST RT/LT  2001    Fracture TX Wrist RT     VASECTOMY Bilateral 09/28/2017    No scalpel technique     wisdom teeth         Social History     Tobacco Use     Smoking status: Never Smoker     Smokeless tobacco: Never Used   Substance Use Topics     Alcohol use: Yes     Family History   Problem Relation Age of Onset     Anxiety Disorder Mother      Colon Polyps Mother      Anxiety Disorder Father      Arthritis Maternal Grandmother      Dementia Maternal Grandmother          Current Outpatient Medications   Medication Sig Dispense Refill     buPROPion (WELLBUTRIN XL) 150 MG 24 hr tablet Take 2 tablets (300 mg) by mouth every morning 60 tablet 4     busPIRone (BUSPAR) 15 MG tablet Take 1 tablet (15 mg) by mouth 3 times daily 90 tablet 4     Allergies   Allergen Reactions     Pcn [Penicillin V]      Penicillin allergy - as a baby, has not had penicillin since then, unknown reaction. Tolerated ancef during surgery 2012     BP Readings from Last 3 Encounters:   01/30/20 132/86   01/07/20 120/70   02/07/19 125/77    Wt Readings from Last 3 Encounters:   01/30/20 98.5 kg (217 lb 3.2 oz)   01/07/20 101.2 kg (223 lb)   02/07/19 103.3 kg (227 lb 12.5 oz)                    Reviewed and updated as needed this visit by Provider         Review of Systems   Constitutional, HEENT, cardiovascular, pulmonary, GI, , musculoskeletal, neuro,  skin, endocrine and psych systems are negative, except as otherwise noted.       Objective   Reported vitals:  There were no vitals taken for this visit.   healthy, alert and no distress  PSYCH: Alert and oriented times 3; coherent speech, normal   rate and volume, able to articulate logical thoughts, able   to abstract reason, no tangential thoughts, no hallucinations   or delusions  His affect is normal  RESP: No cough, no audible wheezing, able to talk in full sentences  Remainder of exam unable to be completed due to telephone visits    Diagnostic Test Results:  Labs reviewed in Epic        Assessment/Plan:    1. Anxiety  Increase Buspar as per patient instructions.   - busPIRone (BUSPAR) 15 MG tablet; Take 1 tablet (15 mg) by mouth 3 times daily  Dispense: 90 tablet; Refill: 4  - buPROPion (WELLBUTRIN XL) 150 MG 24 hr tablet; Take 2 tablets (300 mg) by mouth every morning  Dispense: 60 tablet; Refill: 4    2. Moderate episode of recurrent major depressive disorder (H)  Increase to 300 mg daily.   - buPROPion (WELLBUTRIN XL) 150 MG 24 hr tablet; Take 2 tablets (300 mg) by mouth every morning  Dispense: 60 tablet; Refill: 4    Return in about 4 weeks (around 9/8/2020) for Anxiety Follow up, Depression Follow up.      Phone call duration:  11 minutes    RAQUEL Harper CNP    Patient Instructions   Take your current Buspar/Buspirone dose of 10 mg three times a day starting today.  When you run out, start the 15 mg tablets three times a day.      Increase Wellbutrin/Bupropion to 2 tablets daily.  I sent an updated prescription to your pharmacy.      Look into individual counseling.     Follow up 3-4 weeks.

## 2020-08-11 NOTE — PATIENT INSTRUCTIONS
Take your current Buspar/Buspirone dose of 10 mg three times a day starting today.  When you run out, start the 15 mg tablets three times a day.      Increase Wellbutrin/Bupropion to 2 tablets daily.  I sent an updated prescription to your pharmacy.      Look into individual counseling.     Follow up 3-4 weeks.

## 2020-08-12 ASSESSMENT — ANXIETY QUESTIONNAIRES: GAD7 TOTAL SCORE: 11

## 2020-09-04 NOTE — PROGRESS NOTES
Called and spoke to the patient and scheduled telephone follow up appt for 9/11/2020.  Bharti Armas MA  [facility]  2nd Floor  Primary Care

## 2020-09-11 ENCOUNTER — VIRTUAL VISIT (OUTPATIENT)
Dept: FAMILY MEDICINE | Facility: CLINIC | Age: 40
End: 2020-09-11
Payer: COMMERCIAL

## 2020-09-11 DIAGNOSIS — F32.0 MILD MAJOR DEPRESSION (H): Primary | ICD-10-CM

## 2020-09-11 DIAGNOSIS — F41.9 ANXIETY: ICD-10-CM

## 2020-09-11 PROCEDURE — 99214 OFFICE O/P EST MOD 30 MIN: CPT | Mod: TEL | Performed by: NURSE PRACTITIONER

## 2020-09-11 ASSESSMENT — PAIN SCALES - GENERAL: PAINLEVEL: NO PAIN (0)

## 2020-09-11 ASSESSMENT — ANXIETY QUESTIONNAIRES
5. BEING SO RESTLESS THAT IT IS HARD TO SIT STILL: NOT AT ALL
IF YOU CHECKED OFF ANY PROBLEMS ON THIS QUESTIONNAIRE, HOW DIFFICULT HAVE THESE PROBLEMS MADE IT FOR YOU TO DO YOUR WORK, TAKE CARE OF THINGS AT HOME, OR GET ALONG WITH OTHER PEOPLE: SOMEWHAT DIFFICULT
GAD7 TOTAL SCORE: 2
7. FEELING AFRAID AS IF SOMETHING AWFUL MIGHT HAPPEN: NOT AT ALL
3. WORRYING TOO MUCH ABOUT DIFFERENT THINGS: NOT AT ALL
2. NOT BEING ABLE TO STOP OR CONTROL WORRYING: NOT AT ALL
6. BECOMING EASILY ANNOYED OR IRRITABLE: SEVERAL DAYS
1. FEELING NERVOUS, ANXIOUS, OR ON EDGE: SEVERAL DAYS

## 2020-09-11 ASSESSMENT — PATIENT HEALTH QUESTIONNAIRE - PHQ9
5. POOR APPETITE OR OVEREATING: NOT AT ALL
SUM OF ALL RESPONSES TO PHQ QUESTIONS 1-9: 4

## 2020-09-11 NOTE — PATIENT INSTRUCTIONS
Continue with Buspirone/Buspar three times a day.  Start therapy.  Follow up in 6 weeks and we can consider adding in Prozac daily if needed for anxiety and depression

## 2020-09-11 NOTE — PROGRESS NOTES
"Gabe Murillo is a 40 year old male who is being evaluated via a billable telephone visit.      The patient has been notified of following:     \"This telephone visit will be conducted via a call between you and your physician/provider. We have found that certain health care needs can be provided without the need for a physical exam.  This service lets us provide the care you need with a short phone conversation.  If a prescription is necessary we can send it directly to your pharmacy.  If lab work is needed we can place an order for that and you can then stop by our lab to have the test done at a later time.    Telephone visits are billed at different rates depending on your insurance coverage. During this emergency period, for some insurers they may be billed the same as an in-person visit.  Please reach out to your insurance provider with any questions.    If during the course of the call the physician/provider feels a telephone visit is not appropriate, you will not be charged for this service.\"    Patient has given verbal consent for Telephone visit?  Yes    What phone number would you like to be contacted at? [phone]    How would you like to obtain your AVS? Jorge Urrutia     Gabe Murillo is a 40 year old male who presents via phone visit today for the following health issues:    HPI    Depression and Anxiety Follow-Up    How are you doing with your depression since your last visit? Better    How are you doing with your anxiety since your last visit?  Better    Are you having other symptoms that might be associated with depression or anxiety? No    Have you had a significant life event? OTHER: Covid     Do you have any concerns with your use of alcohol or other drugs? No  Stopped taking Wellbutrin about 3 weeks ago as he felt is was increasing his anxiety (made him want to \"scratch off my own skin).  Feels much better since stopping that.  Depression is actually better.  Anxiety is improved but " still has some irritability.  Still taking Buspirone three times a day which helps for anxiety.    Started couples counseling.  He is more interested now in doing individual therapy.    Social History     Tobacco Use     Smoking status: Never Smoker     Smokeless tobacco: Never Used   Substance Use Topics     Alcohol use: Yes     Drug use: No     PHQ 8/7/2018 7/28/2020 8/11/2020   PHQ-9 Total Score 15 19 5   Q9: Thoughts of better off dead/self-harm past 2 weeks Not at all Not at all Not at all     MILEY-7 SCORE 8/7/2018 7/28/2020 8/11/2020   Total Score 14 12 11     Last PHQ-9 8/11/2020   1.  Little interest or pleasure in doing things 2   2.  Feeling down, depressed, or hopeless 1   3.  Trouble falling or staying asleep, or sleeping too much 1   4.  Feeling tired or having little energy 0   5.  Poor appetite or overeating 0   6.  Feeling bad about yourself 1   7.  Trouble concentrating 0   8.  Moving slowly or restless 0   Q9: Thoughts of better off dead/self-harm past 2 weeks 0   PHQ-9 Total Score 5   Difficulty at work, home, or with people Not difficult at all     MILEY-7  8/11/2020   1. Feeling nervous, anxious, or on edge 2   2. Not being able to stop or control worrying 2   3. Worrying too much about different things 3   4. Trouble relaxing 0   5. Being so restless that it is hard to sit still 0   6. Becoming easily annoyed or irritable 3   7. Feeling afraid, as if something awful might happen 1   MILEY-7 Total Score 11   If you checked any problems, how difficult have they made it for you to do your work, take care of things at home, or get along with other people? Not difficult at all       Suicide Assessment Five-step Evaluation and Treatment (SAFE-T)      How many servings of fruits and vegetables do you eat daily?  0-1    On average, how many sweetened beverages do you drink each day (Examples: soda, juice, sweet tea, etc.  Do NOT count diet or artificially sweetened beverages)?   0-1 a week    How many days  "per week do you exercise enough to make your heart beat faster? Started this week    How many minutes a day do you exercise enough to make your heart beat faster? 20 - 29    How many days per week do you miss taking your medication? Stop taking Wellbutrin     Review of Systems   Constitutional, HEENT, cardiovascular, pulmonary, GI, , musculoskeletal, neuro, skin, endocrine and psych systems are negative, except as otherwise noted.       Objective          Vitals:  No vitals were obtained today due to virtual visit.    healthy, alert and no distress  PSYCH: Alert and oriented times 3; coherent speech, normal   rate and volume, able to articulate logical thoughts, able   to abstract reason, no tangential thoughts, no hallucinations   or delusions  His affect is normal  RESP: No cough, no audible wheezing, able to talk in full sentences  Remainder of exam unable to be completed due to telephone visits            Assessment/Plan:    Assessment & Plan     Depression/Anxiety:  Discussed adding back in an selective serotonin reuptake inhibitor since Wellbutrin caused side effects but I encouraged him to first start therapy as the evidence is very strong in how this can be helpful and there are no side effects.  He agrees.  Follow up 6 weeks or sooner if needed.    BMI:   Estimated body mass index is 31.43 kg/m  as calculated from the following:    Height as of 1/30/20: 1.77 m (5' 9.7\").    Weight as of 1/30/20: 98.5 kg (217 lb 3.2 oz).   Weight management plan: Discussed healthy diet and exercise guidelines        Patient Instructions   Continue with Buspirone/Buspar three times a day.  Start therapy.  Follow up in 6 weeks and we can consider adding in Prozac daily if needed for anxiety and depression         Return in about 6 weeks (around 10/23/2020) for Depression Follow up, Anxiety Follow up.    RAQUEL Harper [facility]    Phone call duration:  11 minutes                "

## 2020-09-12 ASSESSMENT — ANXIETY QUESTIONNAIRES: GAD7 TOTAL SCORE: 2

## 2020-09-29 ENCOUNTER — VIRTUAL VISIT (OUTPATIENT)
Dept: FAMILY MEDICINE | Facility: OTHER | Age: 40
End: 2020-09-29
Payer: COMMERCIAL

## 2020-09-29 DIAGNOSIS — Z20.822 SUSPECTED 2019 NOVEL CORONAVIRUS INFECTION: Primary | ICD-10-CM

## 2020-09-29 PROCEDURE — 99421 OL DIG E/M SVC 5-10 MIN: CPT | Performed by: PHYSICIAN ASSISTANT

## 2020-09-29 NOTE — PROGRESS NOTES
"Date: 2020 11:17:26  Clinician: Leonardo Shell  Clinician NPI: [id number]  Patient: Gabe Murillo  Patient : 1980  Patient Address: [address] [place] [id number]  Patient Phone: [phone]  Visit Protocol: URI  Patient Summary:  Gabe is a 40 year old ( : 1980 ) male who initiated a [facility] Visit for COVID-19 (Coronavirus) evaluation and screening. When asked the question \"Please sign me up to receive news, health information and promotions from [facility].\", aGbe responded \"No\".    Gabe states his symptoms started 1-2 days ago.   His symptoms consist of a cough, nasal congestion, rhinitis, chills, malaise, and a sore throat.   Symptom details     Nasal secretions: The color of his mucus is yellow and green.    Cough: Gabe coughs a few times an hour and his cough is more bothersome at night. Phlegm does not come into his throat when he coughs. He does not believe his cough is caused by post-nasal drip.     Sore throat: Gabe reports having mild throat pain (1-3 on a 10 point pain scale), does not have exudate on his tonsils, and can swallow liquids. The lymph nodes in his neck are not enlarged. A rash has not appeared on the skin since the sore throat started.      Gabe denies having headache, ear pain, anosmia, vomiting, nausea, wheezing, enlarged lymph nodes, facial pain or pressure, fever, myalgias, teeth pain, ageusia, and diarrhea. He also denies taking antibiotic medication in the past month and having recent facial or sinus surgery in the past 60 days. He is not experiencing dyspnea.   Precipitating events  Within the past week, Gabe has not been exposed to someone with strep throat. He has not recently been exposed to someone with influenza. Gabe has not been in close contact with any high risk individuals.   Pertinent COVID-19 (Coronavirus) information  In the past 14 days, Gabe has not worked in a congregate living setting.   He does not work or volunteer as healthcare " worker or a  and does not work or volunteer in a healthcare facility.   Gabe also has not lived in a congregate living setting in the past 14 days. He does not live with a healthcare worker.   Gabe has not had a close contact with a laboratory-confirmed COVID-19 patient within 14 days of symptom onset.   Since December 2019, Gabe and has not had upper respiratory infection or influenza-like illness. Has not been diagnosed with lab-confirmed COVID-19 test   Pertinent medical history  Gabe does not need a return to work/school note.   Weight: 220 lbs   Gabe does not smoke or use smokeless tobacco.   Weight: 220 lbs    MEDICATIONS: buspirone oral, ALLERGIES: Penicillins  Clinician Response:  Dear Gabe,   Your symptoms show that you may have coronavirus (COVID-19). This illness can cause fever, cough and trouble breathing. Many people get a mild case and get better on their own. Some people can get very sick.  What should I do?  We would like to test you for this virus.   1. Please call [phone] to schedule your visit. Explain that you were referred by [facility] to have a COVID-19 test. Be ready to share your [facility] visit ID number.  The following will serve as your written order for this COVID Test, ordered by me, for the indication of suspected COVID [Z20.828]: The test will be ordered in [employer], our electronic health record, after you are scheduled. It will show as ordered and authorized by Kj Singh MD.  Order: COVID-19 (Coronavirus) PCR for SYMPTOMATIC testing from [facility].      2. When it's time for your COVID test:  Stay at least 6 feet away from others. (If someone will drive you to your test, stay in the backseat, as far away from the  as you can.)   Cover your mouth and nose with a mask, tissue or washcloth.  Go straight to the testing site. Don't make any stops on the way there or back.      3.Starting now: Stay home and away from others (self-isolate) until:   You've had no  "fever---and no medicine that reduces fever---for one full day (24 hours). And...   Your other symptoms have gotten better. For example, your cough or breathing has improved. And...   At least 10 days have passed since your symptoms started.       During this time, don't leave the house except for testing or medical care.   Stay in your own room, even for meals. Use your own bathroom if you can.   Stay away from others in your home. No hugging, kissing or shaking hands. No visitors.  Don't go to work, school or anywhere else.    Clean \"high touch\" surfaces often (doorknobs, counters, handles, etc.). Use a household cleaning spray or wipes. You'll find a full list of  on the EPA website: www.epa.gov/pesticide-registration/list-n-disinfectants-use-against-sars-cov-2.   Cover your mouth and nose with a mask, tissue or washcloth to avoid spreading germs.  Wash your hands and face often. Use soap and water.  Caregivers in these groups are at risk for severe illness due to COVID-19:  o People 65 years and older  o People who live in a nursing home or long-term care facility  o People with chronic disease (lung, heart, cancer, diabetes, kidney, liver, immunologic)  o People who have a weakened immune system, including those who:   Are in cancer treatment  Take medicine that weakens the immune system, such as corticosteroids  Had a bone marrow or organ transplant  Have an immune deficiency  Have poorly controlled HIV or AIDS  Are obese (body mass index of 40 or higher)  Smoke regularly   o Caregivers should wear gloves while washing dishes, handling laundry and cleaning bedrooms and bathrooms.  o Use caution when washing and drying laundry: Don't shake dirty laundry, and use the warmest water setting that you can.  o For more tips, go to www.cdc.gov/coronavirus/2019-ncov/downloads/10Things.pdf.    How can I take care of myself?    Get lots of rest. Drink extra fluids (unless a doctor has told you not to).   Take " Tylenol (acetaminophen) for fever or pain. If you have liver or kidney problems, ask your family doctor if it's okay to take Tylenol.   Adults can take either:    650 mg (two 325 mg pills) every 4 to 6 hours, or...   1,000 mg (two 500 mg pills) every 8 hours as needed.    Note: Don't take more than 3,000 mg in one day. Acetaminophen is found in many medicines (both prescribed and over-the-counter medicines). Read all labels to be sure you don't take too much.   For children, check the Tylenol bottle for the right dose. The dose is based on the child's age or weight.    If you have other health problems (like cancer, heart failure, an organ transplant or severe kidney disease): Call your specialty clinic if you don't feel better in the next 2 days.       Know when to call 911. Emergency warning signs include:    Trouble breathing or shortness of breath Pain or pressure in the chest that doesn't go away Feeling confused like you haven't felt before, or not being able to wake up Bluish-colored lips or face.  Where can I get more information?   [facility] -- About COVID-19: [email or web address]   CDC -- What to Do If You're Sick: www.cdc.gov/coronavirus/2019-ncov/about/steps-when-sick.html   CDC -- Ending Home Isolation: www.cdc.gov/coronavirus/2019-ncov/hcp/disposition-in-home-patients.html   CDC -- Caring for Someone: www.cdc.gov/coronavirus/2019-ncov/if-you-are-sick/care-for-someone.html   [facility] -- Interim Guidance for Hospital Discharge to Home: www.health.[facility].[place]/diseases/coronavirus/hcp/hospdischarge.pdf   [facility] clinical trials (COVID-19 research studies): clinicalaffairs.[facility]/umn-clinical-trials    Below are the COVID-19 hotlines at the [place] Department of Health ([facility]). Interpreters are available.    For health questions: Call 429-341-2992 or 1-524.396.1628 (7 a.m. to 7 p.m.) For questions about schools and childcare: Call 062-188-7606 or 1-493.794.7833 (7 a.m. to 7  p.m.)    Diagnosis: Acute upper respiratory infection, unspecified  Diagnosis ICD: J06.9

## 2020-10-01 DIAGNOSIS — Z20.822 SUSPECTED 2019 NOVEL CORONAVIRUS INFECTION: Primary | ICD-10-CM

## 2020-10-01 PROCEDURE — U0003 INFECTIOUS AGENT DETECTION BY NUCLEIC ACID (DNA OR RNA); SEVERE ACUTE RESPIRATORY SYNDROME CORONAVIRUS 2 (SARS-COV-2) (CORONAVIRUS DISEASE [COVID-19]), AMPLIFIED PROBE TECHNIQUE, MAKING USE OF HIGH THROUGHPUT TECHNOLOGIES AS DESCRIBED BY CMS-2020-01-R: HCPCS | Performed by: FAMILY MEDICINE

## 2020-10-02 LAB
SARS-COV-2 RNA SPEC QL NAA+PROBE: NOT DETECTED
SPECIMEN SOURCE: NORMAL

## 2020-11-04 NOTE — PROGRESS NOTES
Spoke to patient and scheduled a follow up appt for 11/6/2020, 7am.  Bharti Armas MA  [facility]  2nd Floor  Primary Care

## 2020-11-06 ENCOUNTER — VIRTUAL VISIT (OUTPATIENT)
Dept: FAMILY MEDICINE | Facility: CLINIC | Age: 40
End: 2020-11-06
Payer: COMMERCIAL

## 2020-11-06 DIAGNOSIS — F32.0 MILD MAJOR DEPRESSION (H): Primary | ICD-10-CM

## 2020-11-06 DIAGNOSIS — F41.9 ANXIETY: ICD-10-CM

## 2020-11-06 PROCEDURE — 99214 OFFICE O/P EST MOD 30 MIN: CPT | Mod: TEL | Performed by: NURSE PRACTITIONER

## 2020-11-06 RX ORDER — BUSPIRONE HYDROCHLORIDE 15 MG/1
15 TABLET ORAL 3 TIMES DAILY PRN
Qty: 90 TABLET | Refills: 4
Start: 2020-11-06 | End: 2021-05-19

## 2020-11-06 NOTE — PATIENT INSTRUCTIONS
Patient Education     Counseling for Depression  For some people, counseling can work as well as medicine for mild to moderate depression. Counseling is also called talk therapy. When done by a trained professional, this treatment is a powerful way to better understand your thoughts and feelings. Like medicine, it may take time before you notice how much counseling is helping.     Kinds of talk therapy  Different counselors use different methods for talk therapy. But all therapy aims to help change how you think about your problem. Most therapy for depression is often done one-on-one. But it may also be done in a group setting. You and your healthcare provider can discuss the type of therapy you think would work best for you. You can also discuss who the best person is to provide the therapy.   How therapy helps  Talking about your problems can help them seem less overwhelming. It can help work through problems you have with your life and your relationships. It can also help you understand how depression is clouding your thinking, not letting you see the world the way it really is. Therapy can give you:     Insight about your emotions    New tools for dealing with your problems    Emotional support for making progress  Getting better takes time  Talk therapy can help you feel better. But change doesn t happen right away. Depression takes away your energy and motivation. So it can be hard to feel like going to therapy and sticking with it. But therapy has been proven to be very valuable in the treatment of depression. Therapy for depression is often done for a set number of sessions. In other cases, you and your therapist decide together at what point you no longer need therapy.   Additional sources of help  In addition to a professional counselor, it may help to talk to other people in your life. You may find support and insight from:     A close friend or family member    A  trained in  counseling    A local support group or community group    A 12-step program such as Alcoholics Anonymous for dealing with problems that can contribute to depression, such as alcohol or drug addiction  [employer] last reviewed this educational content on 9/1/2019 2000-2020 [employer]. [address] [place] [id number]. All rights reserved. This information is not intended as a substitute for professional medical care. Always follow your healthcare professional's instructions.

## 2020-11-06 NOTE — PROGRESS NOTES
"Gabe Murillo is a 40 year old male who is being evaluated via a billable telephone visit.      The patient has been notified of following:     \"This telephone visit will be conducted via a call between you and your physician/provider. We have found that certain health care needs can be provided without the need for a physical exam.  This service lets us provide the care you need with a short phone conversation.  If a prescription is necessary we can send it directly to your pharmacy.  If lab work is needed we can place an order for that and you can then stop by our lab to have the test done at a later time.    Telephone visits are billed at different rates depending on your insurance coverage. During this emergency period, for some insurers they may be billed the same as an in-person visit.  Please reach out to your insurance provider with any questions.    If during the course of the call the physician/provider feels a telephone visit is not appropriate, you will not be charged for this service.\"    Patient has given verbal consent for Telephone visit?  Yes    What phone number would you like to be contacted at? Per chart    How would you like to obtain your AVS? Juan Antoniohart    Ghada     Gabe Murillo is a 40 year old male who presents via phone visit today for the following health issues:    HPI     Depression and Anxiety Follow-Up    How are you doing with your depression since your last visit? Improved     How are you doing with your anxiety since your last visit?  Improved     Are you having other symptoms that might be associated with depression or anxiety? No    Have you had a significant life event? Relationship Concerns     Do you have any concerns with your use of alcohol or other drugs? No  Doing well off of Wellbutrin.  Takes Buspar once a day just as needed.      Marriage counseling is helping quite a bit per patient.  They do both individual and couples.    Social History     Tobacco Use     Smoking " status: Never Smoker     Smokeless tobacco: Never Used   Substance Use Topics     Alcohol use: Yes     Drug use: No     PHQ 7/28/2020 8/11/2020 9/11/2020   PHQ-9 Total Score 19 5 4   Q9: Thoughts of better off dead/self-harm past 2 weeks Not at all Not at all Not at all     MILEY-7 SCORE 7/28/2020 8/11/2020 9/11/2020   Total Score 12 11 2     Last PHQ-9 9/11/2020   1.  Little interest or pleasure in doing things 1   2.  Feeling down, depressed, or hopeless 0   3.  Trouble falling or staying asleep, or sleeping too much 0   4.  Feeling tired or having little energy 1   5.  Poor appetite or overeating 1   6.  Feeling bad about yourself 1   7.  Trouble concentrating 0   8.  Moving slowly or restless 0   Q9: Thoughts of better off dead/self-harm past 2 weeks 0   PHQ-9 Total Score 4   Difficulty at work, home, or with people Somewhat difficult     MILEY-7  9/11/2020   1. Feeling nervous, anxious, or on edge 1   2. Not being able to stop or control worrying 0   3. Worrying too much about different things 0   4. Trouble relaxing 0   5. Being so restless that it is hard to sit still 0   6. Becoming easily annoyed or irritable 1   7. Feeling afraid, as if something awful might happen 0   MILEY-7 Total Score 2   If you checked any problems, how difficult have they made it for you to do your work, take care of things at home, or get along with other people? Somewhat difficult                Review of Systems   Constitutional, HEENT, cardiovascular, pulmonary, GI, , musculoskeletal, neuro, skin, endocrine and psych systems are negative, except as otherwise noted.       Objective          Vitals:  No vitals were obtained today due to virtual visit.    healthy, alert and no distress  PSYCH: Alert and oriented times 3; coherent speech, normal   rate and volume, able to articulate logical thoughts, able   to abstract reason, no tangential thoughts, no hallucinations   or delusions  His affect is normal  RESP: No cough, no audible  wheezing, able to talk in full sentences  Remainder of exam unable to be completed due to telephone visits          Assessment/Plan:    Assessment & Plan     Anxiety  Patient doing very well.  Only uses Buspar now as needed.  He will continue couples counseling and individual counseling and follow up as needed.     Mild major depression (H)  As above.           There are no Patient Instructions on file for this visit.    Return in about 2 months (around 1/6/2021) for Routine preventive, with any available provider.    RAQUEL Harper [facility]    Phone call duration:  6 minutes

## 2020-12-14 ENCOUNTER — HEALTH MAINTENANCE LETTER (OUTPATIENT)
Age: 40
End: 2020-12-14

## 2021-04-17 ENCOUNTER — HEALTH MAINTENANCE LETTER (OUTPATIENT)
Age: 41
End: 2021-04-17

## 2021-04-26 ENCOUNTER — DOCUMENTATION ONLY (OUTPATIENT)
Dept: SLEEP MEDICINE | Facility: CLINIC | Age: 41
End: 2021-04-26

## 2021-04-26 DIAGNOSIS — G47.33 OSA (OBSTRUCTIVE SLEEP APNEA): Primary | ICD-10-CM

## 2021-04-26 NOTE — PROGRESS NOTES
STM recheck        Subjective measures: Patient is struggling with using the machine. He has not been using the device due to migraines and eye issues.   Unsure if it is related to pressures or not.  He initially felt benefit from therapy, however this has been changing over time where he is feeling worse using the device than not using.  H is willing to try a pressure change to see if this helps eye issues.     Assessment: Pt not meeting objective benchmarks for compliance  Patient failing following subjective benchmarks: eye irritation     Action plan: pended order placed to provider for pressure change to 5-8 cm h20  and 2 week STM recheck appt scheduled           PAP settings: CPAP min 5.0 cm  H20       CPAP max 15.0 cm  H20           95th% pressure 9.3 cm  H20        RESMED EPR level Setting: TWO    RESMED Soft response setting:  OFF     Objective measures: 14 day rolling measures      Compliance  21 %      Leak  23.52  lpm  last  upload      AHI 0.46   last  upload      Average number of minutes 114      Objective measure goal  Compliance   Goal >70%  Leak   Goal < 24 lpm  AHI  Goal < 5  Usage  Goal >240        Total time spent on accessing and interpreting remote patient PAP therapy data  10 minutes    Total time spent counseling, coaching  and reviewing PAP therapy data with patient 15 minutes

## 2021-05-12 ENCOUNTER — DOCUMENTATION ONLY (OUTPATIENT)
Dept: SLEEP MEDICINE | Facility: CLINIC | Age: 41
End: 2021-05-12

## 2021-05-12 NOTE — PROGRESS NOTES
[facility] recheck          Subjective measures:  Patient reports he is still having migraines with usage.   He is still having eye issues as well.        Assessment: Pt not meeting objective benchmarks for compliance  Patient failing following subjective benchmarks: continues to have migraines when he is using his device.  Change in pressure settings did not help     Action pln: pt has follow up scheduled on 5/28/21      Device type: Auto-CPAP    PAP settings: CPAP min 5.0 cm  H20       CPAP max 8.0 cm  H20      95th% pressure 6.5 cm  H20        RESMED EPR level Setting: TWO    RESMED Soft response setting:  OFF      Objective measures: 14 day rolling measures      Compliance  28 %      Leak  19.4  lpm  last  upload      AHI 1.3   last  upload      Average number of minutes 142      Objective measure goal  Compliance   Goal >70%  Leak   Goal < 24 lpm  AHI  Goal < 5  Usage  Goal >240        Total time spent on accessing and interpreting remote patient PAP therapy data  10 minutes    Total time spent counseling, coaching  and reviewing PAP therapy data with patient  3 minutes

## 2021-05-19 ENCOUNTER — OFFICE VISIT (OUTPATIENT)
Dept: FAMILY MEDICINE | Facility: CLINIC | Age: 41
End: 2021-05-19
Payer: COMMERCIAL

## 2021-05-19 VITALS
HEART RATE: 72 BPM | OXYGEN SATURATION: 97 % | BODY MASS INDEX: 30.49 KG/M2 | WEIGHT: 213 LBS | DIASTOLIC BLOOD PRESSURE: 88 MMHG | TEMPERATURE: 97.4 F | HEIGHT: 70 IN | SYSTOLIC BLOOD PRESSURE: 122 MMHG | RESPIRATION RATE: 16 BRPM

## 2021-05-19 DIAGNOSIS — B07.0 PLANTAR WARTS: Primary | ICD-10-CM

## 2021-05-19 PROCEDURE — 99213 OFFICE O/P EST LOW 20 MIN: CPT | Performed by: FAMILY MEDICINE

## 2021-05-19 ASSESSMENT — PAIN SCALES - GENERAL: PAINLEVEL: NO PAIN (0)

## 2021-05-19 ASSESSMENT — MIFFLIN-ST. JEOR: SCORE: 1879.47

## 2021-05-19 NOTE — PATIENT INSTRUCTIONS
Our Clinic hours are:  Mondays    7:20 am - 7 pm  Tues -  Fri  7:20 am - 5 pm    Clinic Phone: [phone]    The clinic lab opens at 7:30 am Mon - Fri and appointments are required.    [facility]. [phone]  Monday  8 am - 7pm  Tues - Fri 8 am - 5:30 pm

## 2021-05-19 NOTE — PROGRESS NOTES
"    Assessment & Plan     Plantar warts  Patient has tried and failed cryotherapy, interested in something else  Will refer to podiatry for consideration of canthidrin treatment  - Orthopedic & Spine  Referral; Future     BMI:   Estimated body mass index is 31 kg/m  as calculated from the following:    Height as of this encounter: 1.765 m (5' 9.5\").    Weight as of this encounter: 96.6 kg (213 lb).           No follow-ups on file.    Taylor Damon MD  [facility]    Ghada Bernal is a 41 year old who presents for the following health issues  accompanied by his self:    HPI     Warts  Onset/Duration: 1 year ago  Description (location/number): left foot that has warts that are spreading  Accompanying signs and symptoms (pain, redness):  no   History: prior warts:  no   Therapies tried and outcome: liquid nitrogen and OTC wart treatments              Review of Systems         Objective    /88   Pulse 72   Temp 97.4  F (36.3  C) (Tympanic)   Resp 16   Ht 1.765 m (5' 9.5\")   Wt 96.6 kg (213 lb)   SpO2 97%   BMI 31.00 kg/m    Body mass index is 31 kg/m .  Physical Exam   GENERAL APPEARANCE: healthy, alert and no distress  SKIN: several large clusters of plantar warts on the left foot                "

## 2021-05-24 ENCOUNTER — OFFICE VISIT (OUTPATIENT)
Dept: PODIATRY | Facility: CLINIC | Age: 41
End: 2021-05-24
Payer: COMMERCIAL

## 2021-05-24 VITALS
SYSTOLIC BLOOD PRESSURE: 124 MMHG | HEIGHT: 70 IN | WEIGHT: 213 LBS | DIASTOLIC BLOOD PRESSURE: 82 MMHG | BODY MASS INDEX: 30.49 KG/M2 | HEART RATE: 67 BPM

## 2021-05-24 DIAGNOSIS — B07.0 PLANTAR WARTS: ICD-10-CM

## 2021-05-24 PROCEDURE — 99202 OFFICE O/P NEW SF 15 MIN: CPT | Mod: 25 | Performed by: PODIATRIST

## 2021-05-24 PROCEDURE — 17110 DESTRUCTION B9 LES UP TO 14: CPT | Performed by: PODIATRIST

## 2021-05-24 ASSESSMENT — MIFFLIN-ST. JEOR: SCORE: 1879.47

## 2021-05-24 NOTE — NURSING NOTE
"Chief Complaint   Patient presents with     Plantar Wart     left foot       Initial /82   Pulse 67   Ht 1.765 m (5' 9.5\")   Wt 96.6 kg (213 lb)   BMI 31.00 kg/m   Estimated body mass index is 31 kg/m  as calculated from the following:    Height as of this encounter: 1.765 m (5' 9.5\").    Weight as of this encounter: 96.6 kg (213 lb).  Medications and allergies reviewed.      Valentine ARANA MA    "

## 2021-05-24 NOTE — PATIENT INSTRUCTIONS
Plantar warts    1. Causes  a. Plantar warts are skin lesions caused by invading viruses from the surrounding environment.   b. The viruses traveled down to the base layer of your skin where skin cells are being made.   c. The viruses travel inside the newly forming skin cells and take over the production of new skin cells.   d. This causes dramatic increase in his skin is so production causing the raised bumps at you feel.   e. Normally the immune system will remove foreign viruses easily. In this situation, however, the viruses are inside the bodies old skin cells and therefore cannot be seen by the immune cells.  2. Treatments  a. Goal of treatment: activate the immune response to the viruses resulting removal of the wart tissue and life-long immunity to future viral attacks.  b. There are various treatments for warts.   i. Acids: Salicylic acid (Compound W)  ii. Liquid nitrogen   iii. Topical antiviral medication creams   iv. Topical blistering medications (Cantharidin)       Treating the wart    The treatment that I provide uses a topical blistering medication (Cantharidin) that causes a superficial blister involving the epidermis, where the virus infected skin cells are located.  This blister reaction further exposes the viruses in the base layer of the epidermis as well as getting the attention of the nearby immune cells.  The goal is to have the immune cells see the viruses attached to the skin cells.  When this occurs, and immune response will both eliminate the viral infected wart cells and create immunity to future viral attacks.    1. The medication applied to the wart tissue should remain dry and covered for 4-8 hours. You may then wash the treated area.    2. A blister typically develops within 24 hours.  The blister may hurt.  You can pad the area with a bandage to help protect the skin.    3. This topical medication may require up to 3 applications to show results.  You should return to the office in  two weeks for reevaluation and possible additional treatment.    Please call the office if there are any concerns.

## 2021-05-24 NOTE — PROGRESS NOTES
"PATIENT HISTORY:  Gabe Murillo is a 41 year old male who presents to clinic  with a chief complaint of painful warts.  The patient relates having a wart on the bottom on left foot.  The patient relates pain with ambulation for the past several weeks and getting worse.      An Epic chart review was made with pertinent medical, surgical and family history.     Vitals: /82   Pulse 67   Ht 1.765 m (5' 9.5\")   Wt 96.6 kg (213 lb)   BMI 31.00 kg/m    BMI= Body mass index is 31 kg/m .    LOWER EXTREMITY PHYSICAL EXAM    Dermatologic: Integument is intact with an exception of a verrucous lesion located under the plantar aspect of the ball on the left foot.  One notes pepper spots within the lesion.  One notes pain on palpation over the lesions.  One notes absence of skin lines within the lesions.       Vascular: DP & PT pulses are intact & regular on the left.   CFT and skin temperature is normal to the left lower extremities.     Neurologic: Lower extremity sensation is intact to light touch.  No evidence of weakness in the left lower extremities.        Musculoskeletal: Patient is ambulatory without assistive device or brace.  No gross ankle deformity noted.  No foot or ankle joint effusion is noted.         ASSESSMENT:     ICD-10-CM    1. Plantar warts  B07.0 Orthopedic & Spine  Referral     DESTRUCT BENIGN LESION, UP TO 14    left foot       PLAN:  I have explained to Gabe  about the conditions.  We discussed all risks and benefits from chemical as well as surgical treatments for plantar warts.  At this point, I am recommending topical Cantharone treatment.  The patient gave verbal consented to have the wart tissue treated with Cantharone.  The lesions were sharply debrided with a 15 blade down to viable tissue.   The wart tissue was then treated with Cantharone and occluded with a bandage.  The patient was advised to keep the dressing clean, dry and intact for the rest of the day.  The patient " will follow up in 2 weeks for reevaluation of the wart tissue.      There is low risk of morbidity with the procedure.  There was no overlap in work associated with the evaluation/management and the work associated with the procedure.    Gabe verbalized agreement with and understanding of the rational for the diagnosis and treatment plan.  All questions were answered to best of my ability and the patient's satisfaction. The patient was advised to contact the clinic with any questions that may arise after the clinic visit.      Disclaimer: This note consists of symbols derived from keyboarding, dictation and/or voice recognition software. As a result, there may be errors in the script that have gone undetected. Please consider this when interpreting information found in this chart.       DEISY Levy D.P.M., F.A.C.F.A.S.

## 2021-05-24 NOTE — LETTER
"    5/24/2021         RE: Gabe Murillo  [address] [facility] [id number]        Dear Colleague,    Thank you for referring your patient, Gabe Murillo, to the [facility] [place]. Please see a copy of my visit note below.    PATIENT HISTORY:  Gabe Murillo is a 41 year old male who presents to clinic  with a chief complaint of painful warts.  The patient relates having a wart on the bottom on left foot.  The patient relates pain with ambulation for the past several weeks and getting worse.      An Epic chart review was made with pertinent medical, surgical and family history.     Vitals: /82   Pulse 67   Ht 1.765 m (5' 9.5\")   Wt 96.6 kg (213 lb)   BMI 31.00 kg/m    BMI= Body mass index is 31 kg/m .    LOWER EXTREMITY PHYSICAL EXAM    Dermatologic: Integument is intact with an exception of a verrucous lesion located under the plantar aspect of the ball on the left foot.  One notes pepper spots within the lesion.  One notes pain on palpation over the lesions.  One notes absence of skin lines within the lesions.       Vascular: DP & PT pulses are intact & regular on the left.   CFT and skin temperature is normal to the left lower extremities.     Neurologic: Lower extremity sensation is intact to light touch.  No evidence of weakness in the left lower extremities.        Musculoskeletal: Patient is ambulatory without assistive device or brace.  No gross ankle deformity noted.  No foot or ankle joint effusion is noted.         ASSESSMENT:     ICD-10-CM    1. Plantar warts  B07.0 Orthopedic & Spine  Referral     DESTRUCT BENIGN LESION, UP TO 14    left foot       PLAN:  I have explained to Gabe  about the conditions.  We discussed all risks and benefits from chemical as well as surgical treatments for plantar warts.  At this point, I am recommending topical Cantharone treatment.  The patient gave verbal consented to have the wart tissue treated with " Cantharone.  The lesions were sharply debrided with a 15 blade down to viable tissue.   The wart tissue was then treated with Cantharone and occluded with a bandage.  The patient was advised to keep the dressing clean, dry and intact for the rest of the day.  The patient will follow up in 2 weeks for reevaluation of the wart tissue.      There is low risk of morbidity with the procedure.  There was no overlap in work associated with the evaluation/management and the work associated with the procedure.    Gabe verbalized agreement with and understanding of the rational for the diagnosis and treatment plan.  All questions were answered to best of my ability and the patient's satisfaction. The patient was advised to contact the clinic with any questions that may arise after the clinic visit.      Disclaimer: This note consists of symbols derived from keyboarding, dictation and/or voice recognition software. As a result, there may be errors in the script that have gone undetected. Please consider this when interpreting information found in this chart.       YASMEEN Rain.P.M., F.A.C.F.A.S.             Again, thank you for allowing me to participate in the care of your patient.        Sincerely,        Haile Levy DPM

## 2021-05-27 VITALS — WEIGHT: 213 LBS | BODY MASS INDEX: 30.49 KG/M2 | HEIGHT: 70 IN

## 2021-05-27 ASSESSMENT — MIFFLIN-ST. JEOR: SCORE: 1879.47

## 2021-05-27 NOTE — PROGRESS NOTES
"Gabe is a 41 year old who is being evaluated via a billable video visit.      How would you like to obtain your AVS? MyChart  If the video visit is dropped, the invitation should be resent by: Other e-mail: [email or web address]  Will anyone else be joining your video visit? No  {If patient encounters technical issues they should call [phone] :[phone]}  Gemma Knight CMA  .Does patient have any form of state insurance? no    Do you have wifi? yes  Do you have a smart phone? yes  Can you download an geovany on your phone comfortably with out assistance? yes  Can you watch a Youtube video? yes    Video Start Time: {video visit start/end time for provider to select:152948}  Video-Visit Details    Type of service:  Video Visit    Video End Time:{video visit start/end time for provider to select:152948}    Originating Location (pt. Location): {video visit patient location:668073::\"Home\"}    Distant Location (provider location):  [facility]     Platform used for Video Visit: {Virtual Visit Platforms:854995::\"Well\"}  "

## 2021-05-27 NOTE — PATIENT INSTRUCTIONS
Your BMI is Body mass index is 31 kg/m .  Weight management is a personal decision.  If you are interested in exploring weight loss strategies, the following discussion covers the approaches that may be successful. Body mass index (BMI) is one way to tell whether you are at a healthy weight, overweight, or obese. It measures your weight in relation to your height.  A BMI of 18.5 to 24.9 is in the healthy range. A person with a BMI of 25 to 29.9 is considered overweight, and someone with a BMI of 30 or greater is considered obese. More than two-thirds of American adults are considered overweight or obese.  Being overweight or obese increases the risk for further weight gain. Excess weight may lead to heart disease and diabetes.  Creating and following plans for healthy eating and physical activity may help you improve your health.  Weight control is part of healthy lifestyle and includes exercise, emotional health, and healthy eating habits. Careful eating habits lifelong are the mainstay of weight control. Though there are significant health benefits from weight loss, long-term weight loss with diet alone may be very difficult to achieve- studies show long-term success with dietary management in less than 10% of people. Attaining a healthy weight may be especially difficult to achieve in those with severe obesity. In some cases, medications, devices and surgical management might be considered.  What can you do?  If you are overweight or obese and are interested in methods for weight loss, you should discuss this with your provider.     Consider reducing daily calorie intake by 500 calories.     Keep a food journal.     Avoiding skipping meals, consider cutting portions instead.    Diet combined with exercise helps maintain muscle while optimizing fat loss. Strength training is particularly important for building and maintaining muscle mass. Exercise helps reduce stress, increase energy, and improves fitness.  Increasing exercise without diet control, however, may not burn enough calories to loose weight.       Start walking three days a week 10-20 minutes at a time    Work towards walking thirty minutes five days a week     Eventually, increase the speed of your walking for 1-2 minutes at time    In addition, we recommend that you review healthy lifestyles and methods for weight loss available through the National Institutes of Health patient information sites:  http://win.niddk.nih.gov/publications/index.htm    And look into health and wellness programs that may be available through your health insurance provider, employer, local community center, or [employer].    Weight management plan: Patient was referred to their PCP to discuss a diet and exercise plan.

## 2021-05-28 ENCOUNTER — VIRTUAL VISIT (OUTPATIENT)
Dept: SLEEP MEDICINE | Facility: CLINIC | Age: 41
End: 2021-05-28
Payer: COMMERCIAL

## 2021-05-28 DIAGNOSIS — G47.33 OSA (OBSTRUCTIVE SLEEP APNEA): Primary | ICD-10-CM

## 2021-05-28 DIAGNOSIS — H04.123 DRY EYES, BILATERAL: ICD-10-CM

## 2021-05-28 PROCEDURE — 99212 OFFICE O/P EST SF 10 MIN: CPT | Mod: GT | Performed by: FAMILY MEDICINE

## 2021-05-28 NOTE — PROGRESS NOTES
"Gabe Murillo is a 41 year old male who is being evaluated via a billable video visit.       The patient has been notified of following:      \"This video visit will be conducted via a call between you and your physician/provider. We have found that certain health care needs can be provided without the need for an in-person physical exam.  This service lets us provide the care you need with a video conversation.  If a prescription is necessary we can send it directly to your pharmacy.  If lab work is needed we can place an order for that and you can then stop by our lab to have the test done at a later time.     Video visits are billed at different rates depending on your insurance coverage.  Please reach out to your insurance provider with any questions.     If during the course of the call the physician/provider feels a video visit is not appropriate, you will not be charged for this service.\"     Patient has given verbal consent for Video visit? Yes  How would you like to obtain your AVS? Mail a copy  If you are dropped from the video visit, the video invite should be resent to: Text to cell phone: -  Will anyone else be joining your video visit? No  If patient encounters technical issues they should call [phone]      Video-Visit Details     Type of service:  Video Visit     Video Start Time: 0800  Video End Time: 0820    Originating Location (pt. Location): Home     Distant Location (provider location):  [facility] [place]      Platform used for Video Visit: OurStory    Virtual visit for mild to moderate VALERIA.     Assessment:  - Mild to moderate VALERIA without sleep-associated hypoxemia  - Symptoms of hypersomnia, chronic AM headaches  - Interim weight loss of ~15 lbs with potential excessive pressures on auto 5-15.    Plan:  -We will try reducing CPAP to set pressure 5 cm H2O to see if this improves comfort, reducing potential side effects of headache and dry eye. If pressure still feels " "excessive, consider repeat testing given potential improvement with interim weight loss.    SUBJECTIVE:  Gabe Murillo is a 41 year old year old male.    Primary presenting concerns of daytime fatigue, hypersomnia, chronic headaches with AM headaches.     Pertinent PMHx of anxiety, depression.    2019 - Visit morning after PSG.  Reviewed CPAP, oral appliance, upper airway surgery.  Plan to proceed with CPAP auto 5-15.    Today - Notes from [facility] of side effects from CPAP due to migraines and eye issues.  Initial benefit, now feels that CPAP is worsening symptoms.  Pressure was reduced to auto 5-8 ~2021 and not felt to have improvement in headaches.  Pressure 95th%ile ~6.5 cm H2O.    GINGER Total Score: 10    Prior Sleep Testin2019 - PSG with weight 228 lbs, AHI 6, RDI 27.5, baseline SpO2 95.9%, mitzy SpO2 87.5%, supine REM observed.  PLM index 8.4 with only rare associated cortical arousals.    Past medical history:    Patient Active Problem List    Diagnosis Date Noted     Mild major depression (H) 2020     Priority: Medium     VALERIA (obstructive sleep apnea) 2019     Priority: Medium     PSG on 2019 with weight 228 lbs, AHI ~15, baseline SpO2 ~96%, mitzy SpO2 88.2%.  Supine REM was observed.  PLM index ~9 with infrequent associated cortical arousals.  EKG appeared NSR.         Anxiety 2015     Priority: Medium     CARDIOVASCULAR SCREENING; LDL GOAL LESS THAN 130 2014     Priority: Medium     Genital warts 2011     Priority: Medium       10 point ROS of systems including Constitutional, Eyes, Respiratory, Cardiovascular, Gastroenterology, Genitourinary, Integumentary, Muscularskeletal, Psychiatric were all negative except for pertinent positives noted in my HPI.    No current outpatient medications on file.       OBJECTIVE:  Ht 1.765 m (5' 9.5\")   Wt 96.6 kg (213 lb)   BMI 31.00 kg/m      Physical Exam     ---  This note was written with the assistance of the Dragon " voice-dictation technology software. The final document, although reviewed, may contain errors. For corrections, please contact the office.    Brannon Cheema MD    Sleep Medicine  [facility]  ([phone])  [facility]  ([phone])

## 2021-06-23 ENCOUNTER — OFFICE VISIT (OUTPATIENT)
Dept: PODIATRY | Facility: CLINIC | Age: 41
End: 2021-06-23
Payer: COMMERCIAL

## 2021-06-23 VITALS
WEIGHT: 213 LBS | DIASTOLIC BLOOD PRESSURE: 82 MMHG | SYSTOLIC BLOOD PRESSURE: 134 MMHG | HEART RATE: 67 BPM | HEIGHT: 70 IN | BODY MASS INDEX: 30.49 KG/M2

## 2021-06-23 DIAGNOSIS — B07.0 PLANTAR WARTS: Primary | ICD-10-CM

## 2021-06-23 PROCEDURE — 17110 DESTRUCTION B9 LES UP TO 14: CPT | Performed by: PODIATRIST

## 2021-06-23 ASSESSMENT — MIFFLIN-ST. JEOR: SCORE: 1879.47

## 2021-06-23 NOTE — LETTER
6/23/2021         RE: Gabe Murillo  [address] [place] [id number]        Dear Colleague,    Thank you for referring your patient, Gabe Murillo, to the [facility] [place]. Please see a copy of my visit note below.    Gabe presents to the office for reevaluation of the plantar warts on the left foot.  The patient relates caring for the treated area with no signs of infection noted.  The patient relates that most of the warts are still present.    Physical Exam:    The patient appears to be in no distress and in good spirits. Neurovascular status appears intact. The treated area appears to have no erythema or edema.  No noted drainage.    Noted verrucous lesions on the plantar aspect of the left foot..    Assessment:    ICD-10-CM    1. Plantar warts  B07.0        Plan:    At this point, I recommended a second treatment of topical Cantharone.  The verrucous tissue was treated with topical Cantharone along with bandages.  The patient was instructed to keep the foot dry for the remainder of the day.  The patient may return in four weeks for reevaluation or sooner if problems persist.     Gabe verbalized agreement with and understanding of the rational for the diagnosis and treatment plan.  All questions were answered to best of my ability and the patient's satisfaction. The patient was advised to contact the clinic with any questions that may arise after the clinic visit.      Disclaimer: This note consists of symbols derived from keyboarding, dictation and/or voice recognition software. As a result, there may be errors in the script that have gone undetected. Please consider this when interpreting information found in this chart.    DEISY Levy DPM, FACFAS        Again, thank you for allowing me to participate in the care of your patient.        Sincerely,        Haile Levy DPM

## 2021-06-23 NOTE — PROGRESS NOTES
Gabe presents to the office for reevaluation of the plantar warts on the left foot.  The patient relates caring for the treated area with no signs of infection noted.  The patient relates that most of the warts are still present.    Physical Exam:    The patient appears to be in no distress and in good spirits. Neurovascular status appears intact. The treated area appears to have no erythema or edema.  No noted drainage.    Noted verrucous lesions on the plantar aspect of the left foot..    Assessment:    ICD-10-CM    1. Plantar warts  B07.0        Plan:    At this point, I recommended a second treatment of topical Cantharone.  The verrucous tissue was treated with topical Cantharone along with bandages.  The patient was instructed to keep the foot dry for the remainder of the day.  The patient may return in four weeks for reevaluation or sooner if problems persist.     Gabe verbalized agreement with and understanding of the rational for the diagnosis and treatment plan.  All questions were answered to best of my ability and the patient's satisfaction. The patient was advised to contact the clinic with any questions that may arise after the clinic visit.      Disclaimer: This note consists of symbols derived from keyboarding, dictation and/or voice recognition software. As a result, there may be errors in the script that have gone undetected. Please consider this when interpreting information found in this chart.    DEISY Levy DPM, FACFAS

## 2021-09-16 ENCOUNTER — OFFICE VISIT (OUTPATIENT)
Dept: PODIATRY | Facility: CLINIC | Age: 41
End: 2021-09-16
Payer: COMMERCIAL

## 2021-09-16 VITALS
HEART RATE: 80 BPM | DIASTOLIC BLOOD PRESSURE: 95 MMHG | BODY MASS INDEX: 30.49 KG/M2 | HEIGHT: 70 IN | WEIGHT: 213 LBS | SYSTOLIC BLOOD PRESSURE: 142 MMHG

## 2021-09-16 DIAGNOSIS — B07.0 PLANTAR WARTS: Primary | ICD-10-CM

## 2021-09-16 PROCEDURE — 99213 OFFICE O/P EST LOW 20 MIN: CPT | Mod: 25 | Performed by: PODIATRIST

## 2021-09-16 PROCEDURE — 17110 DESTRUCTION B9 LES UP TO 14: CPT | Performed by: PODIATRIST

## 2021-09-16 RX ORDER — IMIQUIMOD 12.5 MG/.25G
CREAM TOPICAL
Qty: 8 PACKET | Refills: 3 | Status: SHIPPED | OUTPATIENT
Start: 2021-09-16 | End: 2022-08-01

## 2021-09-16 ASSESSMENT — MIFFLIN-ST. JEOR: SCORE: 1879.47

## 2021-09-16 NOTE — NURSING NOTE
"Chief Complaint   Patient presents with     RECHECK     left foot       Initial BP (!) 142/95   Pulse 80   Ht 1.765 m (5' 9.5\")   Wt 96.6 kg (213 lb)   BMI 31.00 kg/m   Estimated body mass index is 31 kg/m  as calculated from the following:    Height as of this encounter: 1.765 m (5' 9.5\").    Weight as of this encounter: 96.6 kg (213 lb).  Medications and allergies reviewed.      Valentine ARANA MA    "

## 2021-09-16 NOTE — PROGRESS NOTES
Gabe presents to the office for reevaluation of the plantar warts on the left foot.  The patient relates caring for the treated area with no signs of infection noted.    Physical Exam:    The patient appears to be in no distress and in good spirits. Neurovascular status appears intact. The treated area appears to have no erythema or edema.  No noted drainage.    Noted residual verrucous lesions on the plantar aspect of the left foot.  No surrounding erythema or edema noted..    Assessment:    ICD-10-CM    1. Plantar warts  B07.0        Plan:    At this point, I recommended another treatment with topical Cantharone along with a course of Aldara cream.  Verrucous lesions were sharply debrided and treated with topical Cantharone with bandages applied.  Patient was prescribed Aldara cream to be applied to the affected skin lesions at bedtime twice weekly for up to 16 weeks.  The patient may return in four weeks for reevaluation or sooner if problems persist.     Gabe verbalized agreement with and understanding of the rational for the diagnosis and treatment plan.  All questions were answered to best of my ability and the patient's satisfaction. The patient was advised to contact the clinic with any questions that may arise after the clinic visit.      Disclaimer: This note consists of symbols derived from keyboarding, dictation and/or voice recognition software. As a result, there may be errors in the script that have gone undetected. Please consider this when interpreting information found in this chart.    DEISY Levy DPM, FACFAS

## 2021-09-16 NOTE — LETTER
9/16/2021         RE: Gabe Murillo  [id number] [address] [place] [id number]        Dear Colleague,    Thank you for referring your patient, Gabe Murillo, to the [facility]. Please see a copy of my visit note below.    Gabe presents to the office for reevaluation of the plantar warts on the left foot.  The patient relates caring for the treated area with no signs of infection noted.    Physical Exam:    The patient appears to be in no distress and in good spirits. Neurovascular status appears intact. The treated area appears to have no erythema or edema.  No noted drainage.    Noted residual verrucous lesions on the plantar aspect of the left foot.  No surrounding erythema or edema noted..    Assessment:    ICD-10-CM    1. Plantar warts  B07.0        Plan:    At this point, I recommended another treatment with topical Cantharone along with a course of Aldara cream.  Verrucous lesions were sharply debrided and treated with topical Cantharone with bandages applied.  Patient was prescribed Aldara cream to be applied to the affected skin lesions at bedtime twice weekly for up to 16 weeks.  The patient may return in four weeks for reevaluation or sooner if problems persist.     Gabe verbalized agreement with and understanding of the rational for the diagnosis and treatment plan.  All questions were answered to best of my ability and the patient's satisfaction. The patient was advised to contact the clinic with any questions that may arise after the clinic visit.      Disclaimer: This note consists of symbols derived from keyboarding, dictation and/or voice recognition software. As a result, there may be errors in the script that have gone undetected. Please consider this when interpreting information found in this chart.    DEISY Levy, JOSE ELIAS, FACFAS        Again, thank you for allowing me to participate in the care of your patient.        Sincerely,        Haile Bonilla  JOSE ELIAS Levy

## 2021-10-02 ENCOUNTER — HEALTH MAINTENANCE LETTER (OUTPATIENT)
Age: 41
End: 2021-10-02

## 2021-10-19 PROBLEM — F32.9 MAJOR DEPRESSION: Status: ACTIVE | Noted: 2020-09-11

## 2022-05-14 ENCOUNTER — HEALTH MAINTENANCE LETTER (OUTPATIENT)
Age: 42
End: 2022-05-14

## 2022-08-01 ENCOUNTER — OFFICE VISIT (OUTPATIENT)
Dept: FAMILY MEDICINE | Facility: CLINIC | Age: 42
End: 2022-08-01
Payer: COMMERCIAL

## 2022-08-01 VITALS
OXYGEN SATURATION: 96 % | HEART RATE: 73 BPM | TEMPERATURE: 98.4 F | RESPIRATION RATE: 16 BRPM | DIASTOLIC BLOOD PRESSURE: 68 MMHG | BODY MASS INDEX: 31.07 KG/M2 | WEIGHT: 217 LBS | SYSTOLIC BLOOD PRESSURE: 110 MMHG | HEIGHT: 70 IN

## 2022-08-01 DIAGNOSIS — F32.0 MILD MAJOR DEPRESSION (H): ICD-10-CM

## 2022-08-01 DIAGNOSIS — D17.30 LIPOMA OF SKIN AND SUBCUTANEOUS TISSUE: Primary | ICD-10-CM

## 2022-08-01 DIAGNOSIS — Z23 NEED FOR VACCINATION: ICD-10-CM

## 2022-08-01 PROCEDURE — 99213 OFFICE O/P EST LOW 20 MIN: CPT | Mod: 25 | Performed by: NURSE PRACTITIONER

## 2022-08-01 PROCEDURE — 90471 IMMUNIZATION ADMIN: CPT | Performed by: NURSE PRACTITIONER

## 2022-08-01 PROCEDURE — 90715 TDAP VACCINE 7 YRS/> IM: CPT | Performed by: NURSE PRACTITIONER

## 2022-08-01 ASSESSMENT — PATIENT HEALTH QUESTIONNAIRE - PHQ9
10. IF YOU CHECKED OFF ANY PROBLEMS, HOW DIFFICULT HAVE THESE PROBLEMS MADE IT FOR YOU TO DO YOUR WORK, TAKE CARE OF THINGS AT HOME, OR GET ALONG WITH OTHER PEOPLE: NOT DIFFICULT AT ALL
SUM OF ALL RESPONSES TO PHQ QUESTIONS 1-9: 0
SUM OF ALL RESPONSES TO PHQ QUESTIONS 1-9: 0

## 2022-08-01 ASSESSMENT — PAIN SCALES - GENERAL: PAINLEVEL: NO PAIN (0)

## 2022-08-01 NOTE — PROGRESS NOTES
"  Assessment & Plan     Lipoma of skin and subcutaneous tissue    - Adult General Surg Referral; Future  Will have him meet with surgeon to verify lipoma versus need for mammogram and discuss treatment options.    Need for vaccination    - TDAP VACCINE (Adacel, Boostrix)  [[id number]]    Mild major depression (H)    Reports this is fine, no concerns.               BMI:   Estimated body mass index is 31.14 kg/m  as calculated from the following:    Height as of this encounter: 1.778 m (5' 10\").    Weight as of this encounter: 98.4 kg (217 lb).       See Patient Instructions  Patient Instructions   Will have you meet with surgeon for area of concern on chest wall.  Call to schedule yearly physical and fasting labs whenever you are able.      Our Clinic hours are:  Mondays    7:20 am - 7 pm  Tues -  Fri  7:20 am - 5 pm    Clinic Phone: [phone]    The clinic lab opens at 7:30 am Mon - Fri and appointments are required.    [facility]. [phone]  Monday  8 am - 7pm  Tues - Fri 8 am - 5:30 pm           Return in about 4 weeks (around 8/29/2022) for or sooner if symptoms persist or worsen, Med Check, Physical Exam, Lab Work.    RAQUEL Quiroz CNP  [facility]    Ghada Bernal is a 42 year old, presenting for the following health issues:  Mass (Lump right breast x 2 year lump is getting bigger )      Mass    History of Present Illness       Reason for visit:  Lump on chest  Symptom onset:  More than a month    He eats 0-1 servings of fruits and vegetables daily.He consumes 1 sweetened beverage(s) daily.He exercises with enough effort to increase his heart rate 30 to 60 minutes per day.    He is taking medications regularly.    Today's PHQ-9         PHQ-9 Total Score: 0    PHQ-9 Q9 Thoughts of better off dead/self-harm past 2 weeks :   Not at all    How difficult have these problems made it for you to do your work, take care of things at home, or get along " "with other people: Not difficult at all       Has felt this lump for about 2 years on his right side of chest, he feels maybe it's gotten a bit bigger. Wife wants it checked out. Doesn't hurt. No other lumps known.        Review of Systems   Constitutional, HEENT, cardiovascular, pulmonary, gi and gu systems are negative, except as otherwise noted.      Objective    /68   Pulse 73   Temp 98.4  F (36.9  C)   Resp 16   Ht 1.778 m (5' 10\")   Wt 98.4 kg (217 lb)   SpO2 96%   BMI 31.14 kg/m    Body mass index is 31.14 kg/m .  Physical Exam   GENERAL: healthy, alert and no distress  CHEST: between mid sternum and right nipple he has a firm, mobile lump, it feels most consistent with lipoma of chest wall  NECK: no adenopathy, no asymmetry  RESP: lungs clear to auscultation - no rales, rhonchi or wheezes  CV: regular rate and rhythm, normal S1 S2, no S3 or S4, no murmur  MS: no gross musculoskeletal defects noted                      .  ..  "

## 2022-08-01 NOTE — PATIENT INSTRUCTIONS
Will have you meet with surgeon for area of concern on chest wall.  Call to schedule yearly physical and fasting labs whenever you are able.      Our Clinic hours are:  Mondays    7:20 am - 7 pm  Tues -  Fri  7:20 am - 5 pm    Clinic Phone: [phone]    The clinic lab opens at 7:30 am Mon - Fri and appointments are required.    [facility]. [phone]  Monday  8 am - 7pm  Tues - Fri 8 am - 5:30 pm

## 2022-08-18 ENCOUNTER — OFFICE VISIT (OUTPATIENT)
Dept: SURGERY | Facility: CLINIC | Age: 42
End: 2022-08-18
Attending: NURSE PRACTITIONER
Payer: COMMERCIAL

## 2022-08-18 VITALS — TEMPERATURE: 97.4 F | HEART RATE: 76 BPM | SYSTOLIC BLOOD PRESSURE: 148 MMHG | DIASTOLIC BLOOD PRESSURE: 91 MMHG

## 2022-08-18 DIAGNOSIS — D17.30 LIPOMA OF SKIN AND SUBCUTANEOUS TISSUE: ICD-10-CM

## 2022-08-18 PROCEDURE — 99203 OFFICE O/P NEW LOW 30 MIN: CPT | Performed by: SURGERY

## 2022-08-18 ASSESSMENT — PAIN SCALES - GENERAL: PAINLEVEL: NO PAIN (0)

## 2022-08-18 NOTE — PROGRESS NOTES
Surgical Consultation/History and Physical  [facility]    Gabe is seen in consultation for chest mass, at the request of Taylor Damon MD.    Chief Complaint:  Chest mass    History of Present Illness: Gabe Murillo is a 42 year old male presents with chest mass.  Present for multiple years.  It has gradually increased in size.  It has not caused any pain.      Denies family history of breast cancer, ovarian cancer, liver problems, marijuana use, nipple discharge.    Patient Active Problem List   Diagnosis     Genital warts     CARDIOVASCULAR SCREENING; LDL GOAL LESS THAN 130     Anxiety     VALERIA (obstructive sleep apnea)     Mild major depression (H)     No past medical history on file.    Past Surgical History:   Procedure Laterality Date     EXCISE GANGLION WRIST  3/9/2012    Procedure:EXCISE GANGLION WRIST; Excise Left Dorsal Ganglion with post interosseous neurectomy- choice Anesthesia; Surgeon:BEULAH GARCIA; Location:[place] OR     FRACTURE TX, WRIST RT/LT  2001    Fracture TX Wrist RT     VASECTOMY Bilateral 09/28/2017    No scalpel technique     wisdom teeth       Family History   Problem Relation Age of Onset     Anxiety Disorder Mother      Colon Polyps Mother      Anxiety Disorder Father      Arthritis Maternal Grandmother      Dementia Maternal Grandmother      Social History     Tobacco Use     Smoking status: Never Smoker     Smokeless tobacco: Never Used   Substance Use Topics     Alcohol use: Yes     Comment: occ      History   Drug Use No     No current outpatient medications on file.     Allergies   Allergen Reactions     Pcn [Penicillin V]      Penicillin allergy - as a baby, has not had penicillin since then, unknown reaction. Tolerated ancef during surgery 2012     Review of Systems:   10 point ROS otherwise negative    Physical Exam:  BP (!) 148/91 (BP Location: Right arm, Patient Position: Sitting, Cuff Size: Adult Regular)   Pulse 76   Temp 97.4  F (36.3  C)  (Tympanic)     Constitutional- No acute distress, well nourished, non-toxic  Eyes: Anicteric, no injection.  PERRL  ENT:  Normocephalic, atraumatic, Nose midline, moist mucus membranes  Neck - supple, no LAD  Neuro - No focal neuro deficits, Alert and oriented x 3  Psych: Appropriate mood and affect  Musculoskeletal: Normal gait, symmetric strength.  FROM upper and lower extremities.  Skin: Warm, Dry; 3.0 cm mobile mass right medial chest wall--consistent with lipoma    Assessment:  1. Lipoma of skin and subcutaneous tissue      Plan:   Mr. Murillo presents with an enlarging right chest wall mass on medial aspect of his chest wall.  This is likely a lipoma.  He is uncertain if he wants it removed at this time, he mainly wants to know what it is.  I do not believe this is a lipoma, will proceed with ultrasound.  Results to be called and discussed with patient.    Robbin Benedict, DO on 8/18/2022 at 2:07 PM

## 2022-08-18 NOTE — LETTER
8/18/2022         RE: Gabe Murillo  [address] [facility] [id number]        Dear Colleague,    Thank you for referring your patient, Gabe Murillo, to the [facility]. Please see a copy of my visit note below.    Surgical Consultation/History and Physical  [facility]    Gabe is seen in consultation for chest mass, at the request of Taylor Damon MD.    Chief Complaint:  Chest mass    History of Present Illness: Gabe Murillo is a 42 year old male presents with chest mass.  Present for multiple years.  It has gradually increased in size.  It has not caused any pain.      Denies family history of breast cancer, ovarian cancer, liver problems, marijuana use, nipple discharge.    Patient Active Problem List   Diagnosis     Genital warts     CARDIOVASCULAR SCREENING; LDL GOAL LESS THAN 130     Anxiety     VALERIA (obstructive sleep apnea)     Mild major depression (H)     No past medical history on file.    Past Surgical History:   Procedure Laterality Date     EXCISE GANGLION WRIST  3/9/2012    Procedure:EXCISE GANGLION WRIST; Excise Left Dorsal Ganglion with post interosseous neurectomy- choice Anesthesia; Surgeon:BEULAH GARCIA; Location:[place]     FRACTURE TX, WRIST RT/LT  2001    Fracture TX Wrist RT     VASECTOMY Bilateral 09/28/2017    No scalpel technique     wisdom teeth       Family History   Problem Relation Age of Onset     Anxiety Disorder Mother      Colon Polyps Mother      Anxiety Disorder Father      Arthritis Maternal Grandmother      Dementia Maternal Grandmother      Social History     Tobacco Use     Smoking status: Never Smoker     Smokeless tobacco: Never Used   Substance Use Topics     Alcohol use: Yes     Comment: occ      History   Drug Use No     No current outpatient medications on file.     Allergies   Allergen Reactions     Pcn [Penicillin V]      Penicillin allergy - as a baby, has not had penicillin since then, unknown  reaction. Tolerated ancef during surgery 2012     Review of Systems:   10 point ROS otherwise negative    Physical Exam:  BP (!) 148/91 (BP Location: Right arm, Patient Position: Sitting, Cuff Size: Adult Regular)   Pulse 76   Temp 97.4  F (36.3  C) (Tympanic)     Constitutional- No acute distress, well nourished, non-toxic  Eyes: Anicteric, no injection.  PERRL  ENT:  Normocephalic, atraumatic, Nose midline, moist mucus membranes  Neck - supple, no LAD  Neuro - No focal neuro deficits, Alert and oriented x 3  Psych: Appropriate mood and affect  Musculoskeletal: Normal gait, symmetric strength.  FROM upper and lower extremities.  Skin: Warm, Dry; 3.0 cm mobile mass right medial chest wall--consistent with lipoma    Assessment:  1. Lipoma of skin and subcutaneous tissue      Plan:   Mr. Murillo presents with an enlarging right chest wall mass on medial aspect of his chest wall.  This is likely a lipoma.  He is uncertain if he wants it removed at this time, he mainly wants to know what it is.  I do not believe this is a lipoma, will proceed with ultrasound.  Results to be called and discussed with patient.    Robbin Benedict DO on 8/18/2022 at 2:07 PM        Again, thank you for allowing me to participate in the care of your patient.        Sincerely,        Robbin Benedict DO

## 2022-09-03 ENCOUNTER — HEALTH MAINTENANCE LETTER (OUTPATIENT)
Age: 42
End: 2022-09-03

## 2022-09-29 ENCOUNTER — ANCILLARY PROCEDURE (OUTPATIENT)
Dept: ULTRASOUND IMAGING | Facility: CLINIC | Age: 42
End: 2022-09-29
Attending: SURGERY
Payer: COMMERCIAL

## 2022-09-29 DIAGNOSIS — D17.30 LIPOMA OF SKIN AND SUBCUTANEOUS TISSUE: ICD-10-CM

## 2022-09-29 PROCEDURE — 76882 US LMTD JT/FCL EVL NVASC XTR: CPT | Performed by: RADIOLOGY

## 2023-06-02 ENCOUNTER — HEALTH MAINTENANCE LETTER (OUTPATIENT)
Age: 43
End: 2023-06-02

## 2023-06-12 ENCOUNTER — OFFICE VISIT (OUTPATIENT)
Dept: FAMILY MEDICINE | Facility: CLINIC | Age: 43
End: 2023-06-12
Payer: COMMERCIAL

## 2023-06-12 VITALS
BODY MASS INDEX: 28.63 KG/M2 | OXYGEN SATURATION: 97 % | SYSTOLIC BLOOD PRESSURE: 120 MMHG | HEIGHT: 70 IN | TEMPERATURE: 97.7 F | RESPIRATION RATE: 16 BRPM | DIASTOLIC BLOOD PRESSURE: 60 MMHG | HEART RATE: 64 BPM | WEIGHT: 200 LBS

## 2023-06-12 DIAGNOSIS — B07.0 PLANTAR WARTS: Primary | ICD-10-CM

## 2023-06-12 PROCEDURE — 17110 DESTRUCTION B9 LES UP TO 14: CPT | Performed by: NURSE PRACTITIONER

## 2023-06-12 ASSESSMENT — PAIN SCALES - GENERAL: PAINLEVEL: NO PAIN (0)

## 2023-06-12 ASSESSMENT — PATIENT HEALTH QUESTIONNAIRE - PHQ9
SUM OF ALL RESPONSES TO PHQ QUESTIONS 1-9: 0
10. IF YOU CHECKED OFF ANY PROBLEMS, HOW DIFFICULT HAVE THESE PROBLEMS MADE IT FOR YOU TO DO YOUR WORK, TAKE CARE OF THINGS AT HOME, OR GET ALONG WITH OTHER PEOPLE: NOT DIFFICULT AT ALL
SUM OF ALL RESPONSES TO PHQ QUESTIONS 1-9: 0

## 2023-06-12 NOTE — PATIENT INSTRUCTIONS
Warts addressed, continue to monitor for any signs of infection.  Consider repeating treatment in 2 weeks if still present.      Our Clinic hours are:  Mondays    7:20 am - 7 pm  Tues -  Fri  7:20 am - 5 pm    Clinic Phone: [phone]    The clinic lab opens at 7:30 am Mon - Fri and appointments are required.    [facility]  Ph. [phone]  Monday  8 am - 7pm  Tues - Fri 8 am - 5:30 pm

## 2023-06-12 NOTE — PROGRESS NOTES
"  Assessment & Plan     Plantar warts    - DESTRUCT BENIGN LESION, UP TO 14    See below.           BMI:   Estimated body mass index is 28.7 kg/m  as calculated from the following:    Height as of this encounter: 1.778 m (5' 10\").    Weight as of this encounter: 90.7 kg (200 lb).       See Patient Instructions  Patient Instructions   Warts addressed, continue to monitor for any signs of infection.  Consider repeating treatment in 2 weeks if still present.      Our Clinic hours are:  Mondays    7:20 am - 7 pm  Tues -  Fri  7:20 am - 5 pm    Clinic Phone: [phone]    The clinic lab opens at 7:30 am Mon - Fri and appointments are required.    [facility]  Ph. [phone]  Monday  8 am - 7pm  Tues - Fri 8 am - 5:30 pm           RAQUEL Quiroz CNP  [facility]    Ghada Bernal is a 43 year old, presenting for the following health issues:  Wart        6/12/2023     4:30 PM   Additional Questions   Roomed by      History of Present Illness       Reason for visit:  Foot  Symptom onset:  3-4 weeks ago    He eats 2-3 servings of fruits and vegetables daily.He consumes 1 sweetened beverage(s) daily.He exercises with enough effort to increase his heart rate 30 to 60 minutes per day.  He exercises with enough effort to increase his heart rate 5 days per week.   He is taking medications regularly.    Today's PHQ-9         PHQ-9 Total Score: 0    PHQ-9 Q9 Thoughts of better off dead/self-harm past 2 weeks :   Not at all    How difficult have these problems made it for you to do your work, take care of things at home, or get along with other people: Not difficult at all       He's had warts treated in the past, is familiar with the treatment. Has 2 small plantar warts on left foot he would like addressed today.        Review of Systems   Constitutional, HEENT, cardiovascular, pulmonary, gi and gu systems are negative, except as otherwise noted.      Objective    BP " "120/60   Pulse 64   Temp 97.7  F (36.5  C)   Resp 16   Ht 1.778 m (5' 10\")   Wt 90.7 kg (200 lb)   SpO2 97%   BMI 28.70 kg/m    Body mass index is 28.7 kg/m .  Physical Exam   GENERAL: healthy, alert and no distress  NECK: no asymmetry  RESP: lungs clear   CV: regular rate and rhythm  MS: no gross musculoskeletal defects noted  SKIN: 2 small plantar warts base of left second toe, 3 cycles of freeze/thaw applied, he tolerated well                    "

## 2023-10-03 ENCOUNTER — OFFICE VISIT (OUTPATIENT)
Dept: FAMILY MEDICINE | Facility: CLINIC | Age: 43
End: 2023-10-03
Payer: COMMERCIAL

## 2023-10-03 VITALS — WEIGHT: 204.6 LBS | BODY MASS INDEX: 29.36 KG/M2

## 2023-10-03 DIAGNOSIS — B07.0 PLANTAR WARTS: Primary | ICD-10-CM

## 2023-10-03 PROCEDURE — 17110 DESTRUCTION B9 LES UP TO 14: CPT | Performed by: NURSE PRACTITIONER

## 2023-10-03 NOTE — PROGRESS NOTES
"  Assessment & Plan     Plantar warts  After discussion of risks and benefits, patient wished to proceed with cryotherapy in the office today.  Warts located bottom of right foot pared with a scalpel and frozen via the freeze-thaw-freeze method with liquid nitrogen three times.  Patient tolerated the procedure well and there were no immediate complications.      BMI:   Estimated body mass index is 29.36 kg/m  as calculated from the following:    Height as of 6/12/23: 1.778 m (5' 10\").    Weight as of this encounter: 92.8 kg (204 lb 9.6 oz).       RAQUEL Ye CNP [facility]    Ghada Bernal is a 43 year old, presenting for the following health issues:  Wart        10/3/2023     8:29 AM   Additional Questions   Roomed by Lauryn IBARRA MA   Accompanied by Children       HPI     Would like referral for ortho for further wart treatment because liquid nitrogen isn't working anymore.  Declined vitals today.    Warts  Onset/Duration: On-going  Description (location/number): 3, on left foot  Accompanying signs and symptoms (pain, redness):  no   History: prior warts: YES  Therapies tried and outcome: liquid nitrogen          Review of Systems   Constitutional, HEENT, cardiovascular, pulmonary, gi and gu systems are negative, except as otherwise noted.      Objective    Wt 92.8 kg (204 lb 9.6 oz)   BMI 29.36 kg/m    Body mass index is 29.36 kg/m .  Physical Exam   GENERAL: healthy, alert and no distress  SKIN: 2 small plantar warts present on bottom of right foot on the ball of the foot.     SKIN: Cryotherapy    Date/Time: 10/3/2023 9:15 AM    Performed by: Isabella Montero APRN CNP  Authorized by: Isabella Montero APRN CNP  Preparation: Patient was prepped and draped in the usual sterile fashion.  Local anesthesia used: no    Anesthesia:  Local anesthesia used: no    Sedation:  Patient sedated: no    Patient tolerance: patient tolerated the procedure well with no immediate " complications  Comments: After discussion of risks and benefits, patient wished to proceed with cryotherapy in the office today.  Warts located ball of right foot pared with a scalpel and frozen via the freeze-thaw-freeze method with liquid nitrogen.  Patient tolerated the procedure well and there were no immediate complications.

## 2023-10-03 NOTE — PATIENT INSTRUCTIONS
1. Recommend allowing any blistering to heal in the next 3-5 days.   2. After any blistering is healed, recommend soaking the feet in warm water for 5 minutes daily.  3. Then use a pumice stone to scrape off the callous.  4. Then apply Salicylic acid (wart remover treatment) daily   5. Return to clinic in 3-4 weeks if not improving or worsening.

## 2023-10-26 ENCOUNTER — OFFICE VISIT (OUTPATIENT)
Dept: FAMILY MEDICINE | Facility: CLINIC | Age: 43
End: 2023-10-26
Payer: COMMERCIAL

## 2023-10-26 VITALS
BODY MASS INDEX: 29.15 KG/M2 | OXYGEN SATURATION: 98 % | DIASTOLIC BLOOD PRESSURE: 88 MMHG | HEART RATE: 56 BPM | TEMPERATURE: 97.3 F | RESPIRATION RATE: 20 BRPM | WEIGHT: 203.6 LBS | HEIGHT: 70 IN | SYSTOLIC BLOOD PRESSURE: 124 MMHG

## 2023-10-26 DIAGNOSIS — B07.0 PLANTAR WARTS: Primary | ICD-10-CM

## 2023-10-26 PROCEDURE — 17110 DESTRUCTION B9 LES UP TO 14: CPT | Performed by: NURSE PRACTITIONER

## 2023-10-26 ASSESSMENT — PAIN SCALES - GENERAL: PAINLEVEL: NO PAIN (0)

## 2023-10-26 NOTE — PROGRESS NOTES
"  Assessment & Plan     Plantar warts  2 small plantar warts present, pared down and treated with liquid nitrogen for 3 freeze thaw cycles.              BMI:   Estimated body mass index is 29.21 kg/m  as calculated from the following:    Height as of this encounter: 1.778 m (5' 10\").    Weight as of this encounter: 92.4 kg (203 lb 9.6 oz).       RAQUEL Ye CNP  [facility]   Gabe is a 43 year old, presenting for the following health issues:  Wart        10/26/2023     7:07 AM   Additional Questions   Roomed by Chelsea VELOZ       History of Present Illness       Reason for visit:  Wart    He eats 2-3 servings of fruits and vegetables daily.He consumes 1 sweetened beverage(s) daily.He exercises with enough effort to increase his heart rate 30 to 60 minutes per day.  He exercises with enough effort to increase his heart rate 5 days per week.   He is taking medications regularly.       Warts  Onset/Duration: ongoing  Description (location/number): 3 on left foot   Accompanying signs and symptoms (pain, redness): No  History: prior warts: YES  Therapies tried and outcome: liquid nitrogen          Review of Systems   Constitutional, HEENT, cardiovascular, pulmonary, gi and gu systems are negative, except as otherwise noted.      Objective    /88   Pulse 56   Temp 97.3  F (36.3  C) (Tympanic)   Resp 20   Ht 1.778 m (5' 10\")   Wt 92.4 kg (203 lb 9.6 oz)   SpO2 98%   BMI 29.21 kg/m    Body mass index is 29.21 kg/m .  Physical Exam   GENERAL: healthy, alert and no distress  SKIN: no suspicious lesions or rashes. 2  (3mm sized) warts present on left plantar ball of foot.                       "

## 2023-11-16 ENCOUNTER — OFFICE VISIT (OUTPATIENT)
Dept: FAMILY MEDICINE | Facility: CLINIC | Age: 43
End: 2023-11-16
Payer: COMMERCIAL

## 2023-11-16 DIAGNOSIS — B07.0 PLANTAR WARTS: Primary | ICD-10-CM

## 2023-11-16 PROCEDURE — 17110 DESTRUCTION B9 LES UP TO 14: CPT | Performed by: NURSE PRACTITIONER

## 2023-11-16 NOTE — PROGRESS NOTES
"  Assessment & Plan       ICD-10-CM    1. Plantar warts  B07.0 DESTRUCT BENIGN LESION, UP TO 14        After discussion of risks and benefits, patient wished to proceed with cryotherapy in the office today.  Warts located left forefoot 3 small in a cluster pared with a scalpel and frozen via the freeze-thaw-freeze method with liquid nitrogen.  Patient tolerated the procedure well and there were no immediate complications.                BMI:   Estimated body mass index is 29.21 kg/m  as calculated from the following:    Height as of 10/26/23: 1.778 m (5' 10\").    Weight as of 10/26/23: 92.4 kg (203 lb 9.6 oz).           RAQUEL Ye [facility]   Gabe is a 43 year old, presenting for the following health issues:  Wart        11/16/2023     8:01 AM   Additional Questions   Roomed by Lauryn IBARRA MA   Accompanied by Daughter       History of Present Illness       Reason for visit:  Wart    He eats 0-1 servings of fruits and vegetables daily.He consumes 1 sweetened beverage(s) daily.He exercises with enough effort to increase his heart rate 30 to 60 minutes per day.    He is taking medications regularly.       Declined vitals today.    Warts  Onset/Duration: On-going issue  Description (location/number): Bottom of left foot, 3 total  Accompanying signs and symptoms (pain, redness):  no   History: prior warts: YES  Therapies tried and outcome: liquid nitrogen and cantharadin    No pain, improving? Thinks they might be slightly smaller in size.         Review of Systems   Constitutional, HEENT, cardiovascular, pulmonary, gi and gu systems are negative, except as otherwise noted.      Objective    There were no vitals taken for this visit.  There is no height or weight on file to calculate BMI.  Physical Exam   GENERAL: healthy, alert and no distress  SKIN: no suspicious lesions or rashes. 3  (3mm sized) warts present on left plantar ball of foot.                         "

## 2023-12-11 ENCOUNTER — NURSE TRIAGE (OUTPATIENT)
Dept: NURSING | Facility: CLINIC | Age: 43
End: 2023-12-11

## 2023-12-11 ENCOUNTER — OFFICE VISIT (OUTPATIENT)
Dept: FAMILY MEDICINE | Facility: CLINIC | Age: 43
End: 2023-12-11
Payer: COMMERCIAL

## 2023-12-11 ENCOUNTER — ANCILLARY PROCEDURE (OUTPATIENT)
Dept: GENERAL RADIOLOGY | Facility: CLINIC | Age: 43
End: 2023-12-11
Attending: NURSE PRACTITIONER
Payer: COMMERCIAL

## 2023-12-11 VITALS
OXYGEN SATURATION: 99 % | WEIGHT: 205 LBS | TEMPERATURE: 97.4 F | DIASTOLIC BLOOD PRESSURE: 84 MMHG | RESPIRATION RATE: 18 BRPM | BODY MASS INDEX: 29.35 KG/M2 | HEART RATE: 47 BPM | HEIGHT: 70 IN | SYSTOLIC BLOOD PRESSURE: 122 MMHG

## 2023-12-11 DIAGNOSIS — S69.92XA WRIST INJURY, LEFT, INITIAL ENCOUNTER: ICD-10-CM

## 2023-12-11 DIAGNOSIS — S69.92XA WRIST INJURY, LEFT, INITIAL ENCOUNTER: Primary | ICD-10-CM

## 2023-12-11 PROCEDURE — 99213 OFFICE O/P EST LOW 20 MIN: CPT | Performed by: NURSE PRACTITIONER

## 2023-12-11 PROCEDURE — 73110 X-RAY EXAM OF WRIST: CPT | Mod: TC | Performed by: RADIOLOGY

## 2023-12-11 ASSESSMENT — ENCOUNTER SYMPTOMS
WEAKNESS: 0
PHOTOPHOBIA: 0
NAUSEA: 0
VOMITING: 0
CARDIOVASCULAR NEGATIVE: 1
PARESTHESIAS: 0
RESPIRATORY NEGATIVE: 1
CHILLS: 0
FEVER: 0
NUMBNESS: 0

## 2023-12-11 ASSESSMENT — PATIENT HEALTH QUESTIONNAIRE - PHQ9
SUM OF ALL RESPONSES TO PHQ QUESTIONS 1-9: 0
SUM OF ALL RESPONSES TO PHQ QUESTIONS 1-9: 0
10. IF YOU CHECKED OFF ANY PROBLEMS, HOW DIFFICULT HAVE THESE PROBLEMS MADE IT FOR YOU TO DO YOUR WORK, TAKE CARE OF THINGS AT HOME, OR GET ALONG WITH OTHER PEOPLE: NOT DIFFICULT AT ALL

## 2023-12-11 NOTE — TELEPHONE ENCOUNTER
"Nurse Triage SBAR    Is this a 2nd Level Triage? NO    Situation: Hand wrist injury        Assessment: Fell off roof yesterday and injured hand/wrist. Is not swollen but is unable to grasp and use hand as normal, unable to grasp with thumb. Pain is \"manageable\"     Protocol Recommended Disposition:   See in Office Today  Care advise given, will go to  in [place] if no available appointments     Bianca Camacho RN on 12/11/2023 at 8:21 AM        Reason for Disposition   Can't use injured hand normally (e.g., make a fist, open fully, hold a glass of water)    Additional Information   Negative: Major bleeding (actively dripping or spurting) that can't be stopped   Negative: Amputation or bone sticking through the skin   Negative: Serious injury with multiple fractures (broken bones)   Negative: Sounds like a life-threatening emergency to the triager   Negative: Finger injury is main concern   Negative: Caused by an animal bite   Negative: Cast problems or questions   Negative: Wound looks infected   Negative: Hand pain from overuse (e.g., physical work, typing)   Negative: Hand pain not from an injury   Negative: Bullet, stabbed by knife or other serious penetrating wound   Negative: High pressure injection injury (e.g., from paint gun, usually work-related)   Negative: Looks like a broken bone or dislocated joint (crooked or deformed)   Negative: Skin is split open or gaping (length > 1/2 inch or 12 mm)   Negative: Bleeding won't stop after 10 minutes of direct pressure (using correct technique)   Negative: Dirt in the wound and not removed after 15 minutes of scrubbing   Negative: Numbness (loss of sensation) of finger(s)   Negative: Sounds like a serious injury to the triager   Negative: Looks infected (e.g., spreading redness, red streak, pus)   Negative: SEVERE pain (e.g., excruciating)    Protocols used: Hand and Wrist Injury-A-OH    "
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
## 2024-07-06 ENCOUNTER — HEALTH MAINTENANCE LETTER (OUTPATIENT)
Age: 44
End: 2024-07-06

## 2025-07-13 ENCOUNTER — HEALTH MAINTENANCE LETTER (OUTPATIENT)
Age: 45
End: 2025-07-13